# Patient Record
Sex: MALE | Race: WHITE | NOT HISPANIC OR LATINO | Employment: UNEMPLOYED | URBAN - METROPOLITAN AREA
[De-identification: names, ages, dates, MRNs, and addresses within clinical notes are randomized per-mention and may not be internally consistent; named-entity substitution may affect disease eponyms.]

---

## 2024-06-23 ENCOUNTER — APPOINTMENT (OUTPATIENT)
Dept: GENERAL RADIOLOGY | Facility: HOSPITAL | Age: 64
DRG: 190 | End: 2024-06-23
Payer: MEDICAID

## 2024-06-23 ENCOUNTER — HOSPITAL ENCOUNTER (INPATIENT)
Facility: HOSPITAL | Age: 64
LOS: 1 days | Discharge: HOSPICE | DRG: 190 | End: 2024-06-24
Attending: EMERGENCY MEDICINE | Admitting: INTERNAL MEDICINE
Payer: MEDICAID

## 2024-06-23 ENCOUNTER — APPOINTMENT (OUTPATIENT)
Dept: CARDIOLOGY | Facility: HOSPITAL | Age: 64
DRG: 190 | End: 2024-06-23
Payer: MEDICAID

## 2024-06-23 DIAGNOSIS — I50.9 ACUTE ON CHRONIC CONGESTIVE HEART FAILURE, UNSPECIFIED HEART FAILURE TYPE: ICD-10-CM

## 2024-06-23 DIAGNOSIS — J44.1 COPD WITH ACUTE EXACERBATION: ICD-10-CM

## 2024-06-23 DIAGNOSIS — F17.200 TOBACCO USE DISORDER: ICD-10-CM

## 2024-06-23 DIAGNOSIS — R05.1 ACUTE COUGH: ICD-10-CM

## 2024-06-23 DIAGNOSIS — R06.02 SHORTNESS OF BREATH: ICD-10-CM

## 2024-06-23 DIAGNOSIS — I48.91 ATRIAL FIBRILLATION WITH RAPID VENTRICULAR RESPONSE: Primary | ICD-10-CM

## 2024-06-23 DIAGNOSIS — I25.5 ISCHEMIC CARDIOMYOPATHY: ICD-10-CM

## 2024-06-23 DIAGNOSIS — I95.9 HYPOTENSION, UNSPECIFIED HYPOTENSION TYPE: ICD-10-CM

## 2024-06-23 DIAGNOSIS — D72.829 LEUKOCYTOSIS, UNSPECIFIED TYPE: ICD-10-CM

## 2024-06-23 DIAGNOSIS — R60.0 PERIPHERAL EDEMA: ICD-10-CM

## 2024-06-23 PROBLEM — I25.10 CAD (CORONARY ARTERY DISEASE): Status: ACTIVE | Noted: 2024-06-23

## 2024-06-23 PROBLEM — I50.22 CHRONIC HFREF (HEART FAILURE WITH REDUCED EJECTION FRACTION): Status: ACTIVE | Noted: 2024-06-23

## 2024-06-23 PROBLEM — I48.0 PAF (PAROXYSMAL ATRIAL FIBRILLATION): Status: ACTIVE | Noted: 2024-06-23

## 2024-06-23 PROBLEM — Z72.0 TOBACCO USE: Status: ACTIVE | Noted: 2024-06-23

## 2024-06-23 PROBLEM — J86.9 EMPYEMA: Status: ACTIVE | Noted: 2024-06-23

## 2024-06-23 LAB
ALBUMIN SERPL-MCNC: 3.8 G/DL (ref 3.5–5.2)
ALBUMIN SERPL-MCNC: 3.9 G/DL (ref 3.5–5.2)
ALBUMIN/GLOB SERPL: 1.3 G/DL
ALBUMIN/GLOB SERPL: 1.7 G/DL
ALP SERPL-CCNC: 104 U/L (ref 39–117)
ALP SERPL-CCNC: 117 U/L (ref 39–117)
ALT SERPL W P-5'-P-CCNC: 24 U/L (ref 1–41)
ALT SERPL W P-5'-P-CCNC: 34 U/L (ref 1–41)
AMPHET+METHAMPHET UR QL: NEGATIVE
AMPHETAMINES UR QL: NEGATIVE
ANION GAP SERPL CALCULATED.3IONS-SCNC: 11 MMOL/L (ref 5–15)
ANION GAP SERPL CALCULATED.3IONS-SCNC: 22 MMOL/L (ref 5–15)
APTT PPP: 29.8 SECONDS (ref 60–90)
ARTERIAL PATENCY WRIST A: ABNORMAL
AST SERPL-CCNC: 25 U/L (ref 1–40)
AST SERPL-CCNC: 37 U/L (ref 1–40)
ATMOSPHERIC PRESS: ABNORMAL MM[HG]
B PARAPERT DNA SPEC QL NAA+PROBE: NOT DETECTED
B PERT DNA SPEC QL NAA+PROBE: NOT DETECTED
BARBITURATES UR QL SCN: NEGATIVE
BASE EXCESS BLDA CALC-SCNC: -4.5 MMOL/L (ref 0–2)
BASOPHILS # BLD AUTO: 0.03 10*3/MM3 (ref 0–0.2)
BASOPHILS # BLD AUTO: 0.03 10*3/MM3 (ref 0–0.2)
BASOPHILS NFR BLD AUTO: 0.2 % (ref 0–1.5)
BASOPHILS NFR BLD AUTO: 0.2 % (ref 0–1.5)
BDY SITE: ABNORMAL
BENZODIAZ UR QL SCN: NEGATIVE
BH CV ECHO MEAS - AO MAX PG: 5.3 MMHG
BH CV ECHO MEAS - AO MEAN PG: 3.3 MMHG
BH CV ECHO MEAS - AO ROOT DIAM: 3.3 CM
BH CV ECHO MEAS - AO V2 MAX: 115 CM/SEC
BH CV ECHO MEAS - AO V2 VTI: 14.7 CM
BH CV ECHO MEAS - AVA(I,D): 1.63 CM2
BH CV ECHO MEAS - EDV(CUBED): 314.4 ML
BH CV ECHO MEAS - EDV(MOD-SP2): 234 ML
BH CV ECHO MEAS - EDV(MOD-SP4): 240 ML
BH CV ECHO MEAS - EF(MOD-BP): 13.7 %
BH CV ECHO MEAS - EF(MOD-SP2): 17.5 %
BH CV ECHO MEAS - EF(MOD-SP4): 13.8 %
BH CV ECHO MEAS - ESV(CUBED): 205.4 ML
BH CV ECHO MEAS - ESV(MOD-SP2): 193 ML
BH CV ECHO MEAS - ESV(MOD-SP4): 207 ML
BH CV ECHO MEAS - FS: 13.2 %
BH CV ECHO MEAS - IVS/LVPW: 0.82 CM
BH CV ECHO MEAS - IVSD: 0.9 CM
BH CV ECHO MEAS - LA DIMENSION: 4.9 CM
BH CV ECHO MEAS - LAT PEAK E' VEL: 13.2 CM/SEC
BH CV ECHO MEAS - LV MASS(C)D: 306 GRAMS
BH CV ECHO MEAS - LV MAX PG: 1.8 MMHG
BH CV ECHO MEAS - LV MEAN PG: 1 MMHG
BH CV ECHO MEAS - LV V1 MAX: 67.1 CM/SEC
BH CV ECHO MEAS - LV V1 VTI: 7.6 CM
BH CV ECHO MEAS - LVIDD: 6.8 CM
BH CV ECHO MEAS - LVIDS: 5.9 CM
BH CV ECHO MEAS - LVOT AREA: 3.1 CM2
BH CV ECHO MEAS - LVOT DIAM: 2 CM
BH CV ECHO MEAS - LVPWD: 1.1 CM
BH CV ECHO MEAS - MED PEAK E' VEL: 5.7 CM/SEC
BH CV ECHO MEAS - MR MAX PG: 67.6 MMHG
BH CV ECHO MEAS - MR MAX VEL: 411 CM/SEC
BH CV ECHO MEAS - MR MEAN PG: 42.5 MMHG
BH CV ECHO MEAS - MR MEAN VEL: 307 CM/SEC
BH CV ECHO MEAS - MR VTI: 114.5 CM
BH CV ECHO MEAS - MV DEC SLOPE: 660.5 CM/SEC2
BH CV ECHO MEAS - MV DEC TIME: 0.14 SEC
BH CV ECHO MEAS - MV E MAX VEL: 94.9 CM/SEC
BH CV ECHO MEAS - MV MAX PG: 5.9 MMHG
BH CV ECHO MEAS - MV MEAN PG: 3.3 MMHG
BH CV ECHO MEAS - MV P1/2T: 52.8 MSEC
BH CV ECHO MEAS - MV V2 VTI: 18.7 CM
BH CV ECHO MEAS - MVA(P1/2T): 4.2 CM2
BH CV ECHO MEAS - MVA(VTI): 1.28 CM2
BH CV ECHO MEAS - PA ACC TIME: 0.09 SEC
BH CV ECHO MEAS - RAP SYSTOLE: 8 MMHG
BH CV ECHO MEAS - RF(MV,LVOT)(1DIAM): 0.9 CM
BH CV ECHO MEAS - SV(LVOT): 24 ML
BH CV ECHO MEAS - SV(MOD-SP2): 41 ML
BH CV ECHO MEAS - SV(MOD-SP4): 33 ML
BH CV ECHO MEAS - TAPSE (>1.6): 1.58 CM
BH CV ECHO MEAS - TR MAX PG: 25.9 MMHG
BH CV ECHO MEAS - TR MAX VEL: 254 CM/SEC
BH CV ECHO MEASUREMENTS AVERAGE E/E' RATIO: 10.04
BH CV XLRA - RV BASE: 4.8 CM
BH CV XLRA - RV LENGTH: 9 CM
BH CV XLRA - RV MID: 2.8 CM
BH CV XLRA - TDI S': 12.3 CM/SEC
BILIRUB SERPL-MCNC: 1.9 MG/DL (ref 0–1.2)
BILIRUB SERPL-MCNC: 2.6 MG/DL (ref 0–1.2)
BODY TEMPERATURE: 37
BUN SERPL-MCNC: 45 MG/DL (ref 8–23)
BUN SERPL-MCNC: 45 MG/DL (ref 8–23)
BUN/CREAT SERPL: 36 (ref 7–25)
BUN/CREAT SERPL: 37.5 (ref 7–25)
BUPRENORPHINE SERPL-MCNC: NEGATIVE NG/ML
C PNEUM DNA NPH QL NAA+NON-PROBE: NOT DETECTED
CALCIUM SPEC-SCNC: 8.5 MG/DL (ref 8.6–10.5)
CALCIUM SPEC-SCNC: 8.5 MG/DL (ref 8.6–10.5)
CANNABINOIDS SERPL QL: NEGATIVE
CHLORIDE SERPL-SCNC: 92 MMOL/L (ref 98–107)
CHLORIDE SERPL-SCNC: 98 MMOL/L (ref 98–107)
CO2 BLDA-SCNC: 18.9 MMOL/L (ref 22–33)
CO2 SERPL-SCNC: 20 MMOL/L (ref 22–29)
CO2 SERPL-SCNC: 24 MMOL/L (ref 22–29)
COCAINE UR QL: NEGATIVE
COHGB MFR BLD: 3.8 % (ref 0–2)
CREAT SERPL-MCNC: 1.2 MG/DL (ref 0.76–1.27)
CREAT SERPL-MCNC: 1.25 MG/DL (ref 0.76–1.27)
D DIMER PPP FEU-MCNC: 1.72 MCGFEU/ML (ref 0–0.64)
D-LACTATE SERPL-SCNC: 2.2 MMOL/L (ref 0.5–2)
D-LACTATE SERPL-SCNC: 4.2 MMOL/L (ref 0.5–2)
D-LACTATE SERPL-SCNC: 4.3 MMOL/L (ref 0.5–2)
D-LACTATE SERPL-SCNC: 4.6 MMOL/L (ref 0.5–2)
DEPRECATED RDW RBC AUTO: 50.7 FL (ref 37–54)
DEPRECATED RDW RBC AUTO: 52 FL (ref 37–54)
EGFRCR SERPLBLD CKD-EPI 2021: 64.3 ML/MIN/1.73
EGFRCR SERPLBLD CKD-EPI 2021: 67.5 ML/MIN/1.73
EOSINOPHIL # BLD AUTO: 0 10*3/MM3 (ref 0–0.4)
EOSINOPHIL # BLD AUTO: 0.02 10*3/MM3 (ref 0–0.4)
EOSINOPHIL NFR BLD AUTO: 0 % (ref 0.3–6.2)
EOSINOPHIL NFR BLD AUTO: 0.1 % (ref 0.3–6.2)
EPAP: 0
ERYTHROCYTE [DISTWIDTH] IN BLOOD BY AUTOMATED COUNT: 14.5 % (ref 12.3–15.4)
ERYTHROCYTE [DISTWIDTH] IN BLOOD BY AUTOMATED COUNT: 14.6 % (ref 12.3–15.4)
ETHANOL BLD-MCNC: <10 MG/DL (ref 0–10)
FENTANYL UR-MCNC: NEGATIVE NG/ML
FLUAV SUBTYP SPEC NAA+PROBE: NOT DETECTED
FLUBV RNA ISLT QL NAA+PROBE: NOT DETECTED
GEN 5 2HR TROPONIN T REFLEX: 35 NG/L
GLOBULIN UR ELPH-MCNC: 2.3 GM/DL
GLOBULIN UR ELPH-MCNC: 2.9 GM/DL
GLUCOSE SERPL-MCNC: 106 MG/DL (ref 65–99)
GLUCOSE SERPL-MCNC: 126 MG/DL (ref 65–99)
HADV DNA SPEC NAA+PROBE: NOT DETECTED
HCO3 BLDA-SCNC: 18.1 MMOL/L (ref 20–26)
HCOV 229E RNA SPEC QL NAA+PROBE: NOT DETECTED
HCOV HKU1 RNA SPEC QL NAA+PROBE: NOT DETECTED
HCOV NL63 RNA SPEC QL NAA+PROBE: NOT DETECTED
HCOV OC43 RNA SPEC QL NAA+PROBE: NOT DETECTED
HCT VFR BLD AUTO: 50.6 % (ref 37.5–51)
HCT VFR BLD AUTO: 56 % (ref 37.5–51)
HCT VFR BLD CALC: 50.1 % (ref 38–51)
HGB BLD-MCNC: 16.9 G/DL (ref 13–17.7)
HGB BLD-MCNC: 18.5 G/DL (ref 13–17.7)
HGB BLDA-MCNC: 16.3 G/DL (ref 13.5–17.5)
HMPV RNA NPH QL NAA+NON-PROBE: NOT DETECTED
HOLD SPECIMEN: NORMAL
HPIV1 RNA ISLT QL NAA+PROBE: NOT DETECTED
HPIV2 RNA SPEC QL NAA+PROBE: NOT DETECTED
HPIV3 RNA NPH QL NAA+PROBE: NOT DETECTED
HPIV4 P GENE NPH QL NAA+PROBE: NOT DETECTED
IMM GRANULOCYTES # BLD AUTO: 0.08 10*3/MM3 (ref 0–0.05)
IMM GRANULOCYTES # BLD AUTO: 0.08 10*3/MM3 (ref 0–0.05)
IMM GRANULOCYTES NFR BLD AUTO: 0.5 % (ref 0–0.5)
IMM GRANULOCYTES NFR BLD AUTO: 0.6 % (ref 0–0.5)
INHALED O2 CONCENTRATION: 36 %
INR PPP: 1.42 (ref 0.89–1.12)
IPAP: 0
LEFT ATRIUM VOLUME INDEX: 55.7 ML/M2
LIPASE SERPL-CCNC: 19 U/L (ref 13–60)
LYMPHOCYTES # BLD AUTO: 0.97 10*3/MM3 (ref 0.7–3.1)
LYMPHOCYTES # BLD AUTO: 1.94 10*3/MM3 (ref 0.7–3.1)
LYMPHOCYTES NFR BLD AUTO: 11.3 % (ref 19.6–45.3)
LYMPHOCYTES NFR BLD AUTO: 7.4 % (ref 19.6–45.3)
M PNEUMO IGG SER IA-ACNC: NOT DETECTED
MAGNESIUM SERPL-MCNC: 2 MG/DL (ref 1.6–2.4)
MAGNESIUM SERPL-MCNC: 2.2 MG/DL (ref 1.6–2.4)
MCH RBC QN AUTO: 31.9 PG (ref 26.6–33)
MCH RBC QN AUTO: 32.1 PG (ref 26.6–33)
MCHC RBC AUTO-ENTMCNC: 33 G/DL (ref 31.5–35.7)
MCHC RBC AUTO-ENTMCNC: 33.4 G/DL (ref 31.5–35.7)
MCV RBC AUTO: 95.5 FL (ref 79–97)
MCV RBC AUTO: 97.1 FL (ref 79–97)
METHADONE UR QL SCN: NEGATIVE
METHGB BLD QL: 0.2 % (ref 0–1.5)
MODALITY: ABNORMAL
MONOCYTES # BLD AUTO: 0.63 10*3/MM3 (ref 0.1–0.9)
MONOCYTES # BLD AUTO: 1.61 10*3/MM3 (ref 0.1–0.9)
MONOCYTES NFR BLD AUTO: 4.8 % (ref 5–12)
MONOCYTES NFR BLD AUTO: 9.4 % (ref 5–12)
NEUTROPHILS NFR BLD AUTO: 11.47 10*3/MM3 (ref 1.7–7)
NEUTROPHILS NFR BLD AUTO: 13.5 10*3/MM3 (ref 1.7–7)
NEUTROPHILS NFR BLD AUTO: 78.5 % (ref 42.7–76)
NEUTROPHILS NFR BLD AUTO: 87 % (ref 42.7–76)
NRBC BLD AUTO-RTO: 0 /100 WBC (ref 0–0.2)
NRBC BLD AUTO-RTO: 0 /100 WBC (ref 0–0.2)
NT-PROBNP SERPL-MCNC: ABNORMAL PG/ML (ref 0–900)
OPIATES UR QL: POSITIVE
OXYCODONE UR QL SCN: NEGATIVE
OXYHGB MFR BLDV: 94.3 % (ref 94–99)
PAW @ PEAK INSP FLOW SETTING VENT: 0 CMH2O
PCO2 BLDA: 27.2 MM HG (ref 35–45)
PCO2 TEMP ADJ BLD: 27.2 MM HG (ref 35–48)
PCP UR QL SCN: NEGATIVE
PH BLDA: 7.43 PH UNITS (ref 7.35–7.45)
PH, TEMP CORRECTED: 7.43 PH UNITS
PHOSPHATE SERPL-MCNC: 4.8 MG/DL (ref 2.5–4.5)
PLATELET # BLD AUTO: 204 10*3/MM3 (ref 140–450)
PLATELET # BLD AUTO: 240 10*3/MM3 (ref 140–450)
PMV BLD AUTO: 11.3 FL (ref 6–12)
PMV BLD AUTO: 11.5 FL (ref 6–12)
PO2 BLDA: 92.6 MM HG (ref 83–108)
PO2 TEMP ADJ BLD: 92.6 MM HG (ref 83–108)
POTASSIUM SERPL-SCNC: 4 MMOL/L (ref 3.5–5.2)
POTASSIUM SERPL-SCNC: 4.7 MMOL/L (ref 3.5–5.2)
PROT SERPL-MCNC: 6.1 G/DL (ref 6–8.5)
PROT SERPL-MCNC: 6.8 G/DL (ref 6–8.5)
PROTHROMBIN TIME: 17.5 SECONDS (ref 12.2–14.5)
RBC # BLD AUTO: 5.3 10*6/MM3 (ref 4.14–5.8)
RBC # BLD AUTO: 5.77 10*6/MM3 (ref 4.14–5.8)
RHINOVIRUS RNA SPEC NAA+PROBE: NOT DETECTED
RSV RNA NPH QL NAA+NON-PROBE: NOT DETECTED
SARS-COV-2 RNA NPH QL NAA+NON-PROBE: NOT DETECTED
SODIUM SERPL-SCNC: 133 MMOL/L (ref 136–145)
SODIUM SERPL-SCNC: 134 MMOL/L (ref 136–145)
TOTAL RATE: 0 BREATHS/MINUTE
TRICYCLICS UR QL SCN: NEGATIVE
TROPONIN T DELTA: 1 NG/L
TROPONIN T SERPL HS-MCNC: 34 NG/L
TROPONIN T SERPL HS-MCNC: 40 NG/L
TSH SERPL DL<=0.05 MIU/L-ACNC: 3.17 UIU/ML (ref 0.27–4.2)
UFH PPP CHRO-ACNC: 0.1 IU/ML (ref 0.3–0.7)
WBC NRBC COR # BLD AUTO: 13.18 10*3/MM3 (ref 3.4–10.8)
WBC NRBC COR # BLD AUTO: 17.18 10*3/MM3 (ref 3.4–10.8)
WHOLE BLOOD HOLD COAG: NORMAL
WHOLE BLOOD HOLD SPECIMEN: NORMAL

## 2024-06-23 PROCEDURE — 25010000002 METHYLPREDNISOLONE PER 125 MG: Performed by: EMERGENCY MEDICINE

## 2024-06-23 PROCEDURE — 94799 UNLISTED PULMONARY SVC/PX: CPT

## 2024-06-23 PROCEDURE — 83880 ASSAY OF NATRIURETIC PEPTIDE: CPT | Performed by: EMERGENCY MEDICINE

## 2024-06-23 PROCEDURE — 0202U NFCT DS 22 TRGT SARS-COV-2: CPT | Performed by: EMERGENCY MEDICINE

## 2024-06-23 PROCEDURE — 83605 ASSAY OF LACTIC ACID: CPT | Performed by: EMERGENCY MEDICINE

## 2024-06-23 PROCEDURE — 85025 COMPLETE CBC W/AUTO DIFF WBC: CPT | Performed by: INTERNAL MEDICINE

## 2024-06-23 PROCEDURE — 25010000002 MORPHINE PER 10 MG: Performed by: INTERNAL MEDICINE

## 2024-06-23 PROCEDURE — 36415 COLL VENOUS BLD VENIPUNCTURE: CPT

## 2024-06-23 PROCEDURE — 93306 TTE W/DOPPLER COMPLETE: CPT | Performed by: INTERNAL MEDICINE

## 2024-06-23 PROCEDURE — 80053 COMPREHEN METABOLIC PANEL: CPT | Performed by: INTERNAL MEDICINE

## 2024-06-23 PROCEDURE — 25010000002 ENOXAPARIN PER 10 MG: Performed by: NURSE PRACTITIONER

## 2024-06-23 PROCEDURE — 85610 PROTHROMBIN TIME: CPT | Performed by: EMERGENCY MEDICINE

## 2024-06-23 PROCEDURE — 25010000002 DOBUTAMINE PER 250 MG: Performed by: EMERGENCY MEDICINE

## 2024-06-23 PROCEDURE — 25010000002 ONDANSETRON PER 1 MG: Performed by: INTERNAL MEDICINE

## 2024-06-23 PROCEDURE — 25010000002 DIGOXIN PER 500 MCG: Performed by: NURSE PRACTITIONER

## 2024-06-23 PROCEDURE — 84100 ASSAY OF PHOSPHORUS: CPT | Performed by: INTERNAL MEDICINE

## 2024-06-23 PROCEDURE — 25010000002 HEPARIN (PORCINE) 25000-0.45 UT/250ML-% SOLUTION: Performed by: EMERGENCY MEDICINE

## 2024-06-23 PROCEDURE — 25810000003 SODIUM CHLORIDE 0.9 % SOLUTION 250 ML FLEX CONT: Performed by: EMERGENCY MEDICINE

## 2024-06-23 PROCEDURE — 80307 DRUG TEST PRSMV CHEM ANLYZR: CPT | Performed by: EMERGENCY MEDICINE

## 2024-06-23 PROCEDURE — 93005 ELECTROCARDIOGRAM TRACING: CPT | Performed by: EMERGENCY MEDICINE

## 2024-06-23 PROCEDURE — 36600 WITHDRAWAL OF ARTERIAL BLOOD: CPT

## 2024-06-23 PROCEDURE — 25010000002 CEFTRIAXONE PER 250 MG: Performed by: EMERGENCY MEDICINE

## 2024-06-23 PROCEDURE — 94640 AIRWAY INHALATION TREATMENT: CPT

## 2024-06-23 PROCEDURE — 82805 BLOOD GASES W/O2 SATURATION: CPT

## 2024-06-23 PROCEDURE — 93010 ELECTROCARDIOGRAM REPORT: CPT | Performed by: INTERNAL MEDICINE

## 2024-06-23 PROCEDURE — 25010000002 PHENYLEPHRINE 10 MG/ML SOLUTION 5 ML VIAL: Performed by: EMERGENCY MEDICINE

## 2024-06-23 PROCEDURE — 25010000002 METHYLPREDNISOLONE PER 40 MG: Performed by: NURSE PRACTITIONER

## 2024-06-23 PROCEDURE — 99291 CRITICAL CARE FIRST HOUR: CPT

## 2024-06-23 PROCEDURE — 83690 ASSAY OF LIPASE: CPT | Performed by: EMERGENCY MEDICINE

## 2024-06-23 PROCEDURE — 83605 ASSAY OF LACTIC ACID: CPT | Performed by: INTERNAL MEDICINE

## 2024-06-23 PROCEDURE — 82375 ASSAY CARBOXYHB QUANT: CPT

## 2024-06-23 PROCEDURE — 85379 FIBRIN DEGRADATION QUANT: CPT | Performed by: EMERGENCY MEDICINE

## 2024-06-23 PROCEDURE — 80053 COMPREHEN METABOLIC PANEL: CPT | Performed by: EMERGENCY MEDICINE

## 2024-06-23 PROCEDURE — 85025 COMPLETE CBC W/AUTO DIFF WBC: CPT | Performed by: EMERGENCY MEDICINE

## 2024-06-23 PROCEDURE — 93306 TTE W/DOPPLER COMPLETE: CPT

## 2024-06-23 PROCEDURE — 85730 THROMBOPLASTIN TIME PARTIAL: CPT | Performed by: EMERGENCY MEDICINE

## 2024-06-23 PROCEDURE — 99291 CRITICAL CARE FIRST HOUR: CPT | Performed by: INTERNAL MEDICINE

## 2024-06-23 PROCEDURE — 84443 ASSAY THYROID STIM HORMONE: CPT | Performed by: EMERGENCY MEDICINE

## 2024-06-23 PROCEDURE — 25010000002 BUMETANIDE PER 0.5 MG: Performed by: NURSE PRACTITIONER

## 2024-06-23 PROCEDURE — 83735 ASSAY OF MAGNESIUM: CPT | Performed by: INTERNAL MEDICINE

## 2024-06-23 PROCEDURE — 83735 ASSAY OF MAGNESIUM: CPT | Performed by: EMERGENCY MEDICINE

## 2024-06-23 PROCEDURE — 25010000002 MORPHINE PER 10 MG: Performed by: NURSE PRACTITIONER

## 2024-06-23 PROCEDURE — 83050 HGB METHEMOGLOBIN QUAN: CPT

## 2024-06-23 PROCEDURE — 93005 ELECTROCARDIOGRAM TRACING: CPT

## 2024-06-23 PROCEDURE — 25810000003 SODIUM CHLORIDE 0.9 % SOLUTION: Performed by: EMERGENCY MEDICINE

## 2024-06-23 PROCEDURE — 82077 ASSAY SPEC XCP UR&BREATH IA: CPT | Performed by: EMERGENCY MEDICINE

## 2024-06-23 PROCEDURE — 71045 X-RAY EXAM CHEST 1 VIEW: CPT

## 2024-06-23 PROCEDURE — 84484 ASSAY OF TROPONIN QUANT: CPT | Performed by: INTERNAL MEDICINE

## 2024-06-23 PROCEDURE — 85520 HEPARIN ASSAY: CPT | Performed by: EMERGENCY MEDICINE

## 2024-06-23 PROCEDURE — 87040 BLOOD CULTURE FOR BACTERIA: CPT | Performed by: EMERGENCY MEDICINE

## 2024-06-23 PROCEDURE — 25010000002 MAGNESIUM SULFATE 2 GM/50ML SOLUTION: Performed by: NURSE PRACTITIONER

## 2024-06-23 PROCEDURE — 84484 ASSAY OF TROPONIN QUANT: CPT | Performed by: EMERGENCY MEDICINE

## 2024-06-23 RX ORDER — ASPIRIN 81 MG/1
324 TABLET, CHEWABLE ORAL ONCE
Status: DISCONTINUED | OUTPATIENT
Start: 2024-06-23 | End: 2024-06-23

## 2024-06-23 RX ORDER — IPRATROPIUM BROMIDE AND ALBUTEROL SULFATE 2.5; .5 MG/3ML; MG/3ML
3 SOLUTION RESPIRATORY (INHALATION) EVERY 4 HOURS PRN
Status: DISCONTINUED | OUTPATIENT
Start: 2024-06-23 | End: 2024-06-24 | Stop reason: HOSPADM

## 2024-06-23 RX ORDER — ENOXAPARIN SODIUM 100 MG/ML
40 INJECTION SUBCUTANEOUS DAILY
Status: DISCONTINUED | OUTPATIENT
Start: 2024-06-23 | End: 2024-06-24 | Stop reason: HOSPADM

## 2024-06-23 RX ORDER — DIGOXIN 125 MCG
125 TABLET ORAL
Status: DISCONTINUED | OUTPATIENT
Start: 2024-06-24 | End: 2024-06-24 | Stop reason: HOSPADM

## 2024-06-23 RX ORDER — HEPARIN SODIUM 1000 [USP'U]/ML
4000 INJECTION, SOLUTION INTRAVENOUS; SUBCUTANEOUS ONCE
Status: DISCONTINUED | OUTPATIENT
Start: 2024-06-23 | End: 2024-06-23

## 2024-06-23 RX ORDER — AMOXICILLIN 250 MG
2 CAPSULE ORAL 2 TIMES DAILY
Status: DISCONTINUED | OUTPATIENT
Start: 2024-06-23 | End: 2024-06-24 | Stop reason: HOSPADM

## 2024-06-23 RX ORDER — GABAPENTIN 600 MG/1
600 TABLET ORAL 2 TIMES DAILY
COMMUNITY

## 2024-06-23 RX ORDER — ONDANSETRON 2 MG/ML
4 INJECTION INTRAMUSCULAR; INTRAVENOUS EVERY 6 HOURS PRN
Status: DISCONTINUED | OUTPATIENT
Start: 2024-06-23 | End: 2024-06-24 | Stop reason: HOSPADM

## 2024-06-23 RX ORDER — IPRATROPIUM BROMIDE AND ALBUTEROL SULFATE 2.5; .5 MG/3ML; MG/3ML
3 SOLUTION RESPIRATORY (INHALATION) ONCE
Status: COMPLETED | OUTPATIENT
Start: 2024-06-23 | End: 2024-06-23

## 2024-06-23 RX ORDER — MAGNESIUM SULFATE HEPTAHYDRATE 40 MG/ML
2 INJECTION, SOLUTION INTRAVENOUS ONCE
Status: COMPLETED | OUTPATIENT
Start: 2024-06-23 | End: 2024-06-23

## 2024-06-23 RX ORDER — GABAPENTIN 100 MG/1
100 CAPSULE ORAL NIGHTLY
Status: DISCONTINUED | OUTPATIENT
Start: 2024-06-23 | End: 2024-06-24

## 2024-06-23 RX ORDER — POLYETHYLENE GLYCOL 3350 17 G/17G
17 POWDER, FOR SOLUTION ORAL DAILY PRN
Status: DISCONTINUED | OUTPATIENT
Start: 2024-06-23 | End: 2024-06-24 | Stop reason: HOSPADM

## 2024-06-23 RX ORDER — SODIUM CHLORIDE 0.9 % (FLUSH) 0.9 %
10 SYRINGE (ML) INJECTION AS NEEDED
Status: DISCONTINUED | OUTPATIENT
Start: 2024-06-23 | End: 2024-06-23

## 2024-06-23 RX ORDER — DOBUTAMINE HYDROCHLORIDE 100 MG/100ML
2-20 INJECTION INTRAVENOUS
Status: DISCONTINUED | OUTPATIENT
Start: 2024-06-23 | End: 2024-06-23

## 2024-06-23 RX ORDER — MORPHINE SULFATE 2 MG/ML
1 INJECTION, SOLUTION INTRAMUSCULAR; INTRAVENOUS EVERY 4 HOURS PRN
Status: DISCONTINUED | OUTPATIENT
Start: 2024-06-23 | End: 2024-06-23

## 2024-06-23 RX ORDER — METHYLPREDNISOLONE SODIUM SUCCINATE 40 MG/ML
40 INJECTION, POWDER, LYOPHILIZED, FOR SOLUTION INTRAMUSCULAR; INTRAVENOUS EVERY 12 HOURS
Status: DISCONTINUED | OUTPATIENT
Start: 2024-06-23 | End: 2024-06-24

## 2024-06-23 RX ORDER — DIGOXIN 0.25 MG/ML
250 INJECTION INTRAMUSCULAR; INTRAVENOUS ONCE
Status: COMPLETED | OUTPATIENT
Start: 2024-06-23 | End: 2024-06-23

## 2024-06-23 RX ORDER — UREA 10 %
5 LOTION (ML) TOPICAL NIGHTLY PRN
Status: DISCONTINUED | OUTPATIENT
Start: 2024-06-23 | End: 2024-06-24 | Stop reason: HOSPADM

## 2024-06-23 RX ORDER — HEPARIN SODIUM 1000 [USP'U]/ML
25 INJECTION, SOLUTION INTRAVENOUS; SUBCUTANEOUS AS NEEDED
Status: DISCONTINUED | OUTPATIENT
Start: 2024-06-23 | End: 2024-06-23

## 2024-06-23 RX ORDER — HEPARIN SODIUM 1000 [USP'U]/ML
50 INJECTION, SOLUTION INTRAVENOUS; SUBCUTANEOUS AS NEEDED
Status: DISCONTINUED | OUTPATIENT
Start: 2024-06-23 | End: 2024-06-23

## 2024-06-23 RX ORDER — SODIUM CHLORIDE 9 MG/ML
40 INJECTION, SOLUTION INTRAVENOUS AS NEEDED
Status: DISCONTINUED | OUTPATIENT
Start: 2024-06-23 | End: 2024-06-24 | Stop reason: HOSPADM

## 2024-06-23 RX ORDER — BUMETANIDE 0.25 MG/ML
2 INJECTION INTRAMUSCULAR; INTRAVENOUS ONCE
Status: COMPLETED | OUTPATIENT
Start: 2024-06-23 | End: 2024-06-23

## 2024-06-23 RX ORDER — BISACODYL 10 MG
10 SUPPOSITORY, RECTAL RECTAL DAILY PRN
Status: DISCONTINUED | OUTPATIENT
Start: 2024-06-23 | End: 2024-06-24 | Stop reason: HOSPADM

## 2024-06-23 RX ORDER — PROMETHAZINE HYDROCHLORIDE 12.5 MG/1
12.5 TABLET ORAL EVERY 6 HOURS PRN
Status: DISCONTINUED | OUTPATIENT
Start: 2024-06-23 | End: 2024-06-24 | Stop reason: HOSPADM

## 2024-06-23 RX ORDER — SODIUM CHLORIDE 0.9 % (FLUSH) 0.9 %
10 SYRINGE (ML) INJECTION EVERY 12 HOURS SCHEDULED
Status: DISCONTINUED | OUTPATIENT
Start: 2024-06-23 | End: 2024-06-24 | Stop reason: HOSPADM

## 2024-06-23 RX ORDER — METOLAZONE 5 MG/1
20 TABLET ORAL ONCE
Status: COMPLETED | OUTPATIENT
Start: 2024-06-23 | End: 2024-06-23

## 2024-06-23 RX ORDER — HEPARIN SODIUM 10000 [USP'U]/100ML
12 INJECTION, SOLUTION INTRAVENOUS
Status: DISCONTINUED | OUTPATIENT
Start: 2024-06-23 | End: 2024-06-23

## 2024-06-23 RX ORDER — BUMETANIDE 0.25 MG/ML
2 INJECTION INTRAMUSCULAR; INTRAVENOUS ONCE
Status: COMPLETED | OUTPATIENT
Start: 2024-06-24 | End: 2024-06-24

## 2024-06-23 RX ORDER — BISACODYL 5 MG/1
5 TABLET, DELAYED RELEASE ORAL DAILY PRN
Status: DISCONTINUED | OUTPATIENT
Start: 2024-06-23 | End: 2024-06-24 | Stop reason: HOSPADM

## 2024-06-23 RX ORDER — PROMETHAZINE HYDROCHLORIDE 12.5 MG/1
12.5 SUPPOSITORY RECTAL EVERY 6 HOURS PRN
Status: DISCONTINUED | OUTPATIENT
Start: 2024-06-23 | End: 2024-06-24 | Stop reason: HOSPADM

## 2024-06-23 RX ORDER — NITROGLYCERIN 0.4 MG/1
0.4 TABLET SUBLINGUAL
Status: DISCONTINUED | OUTPATIENT
Start: 2024-06-23 | End: 2024-06-24 | Stop reason: HOSPADM

## 2024-06-23 RX ORDER — MORPHINE SULFATE 2 MG/ML
1 INJECTION, SOLUTION INTRAMUSCULAR; INTRAVENOUS ONCE
Status: COMPLETED | OUTPATIENT
Start: 2024-06-23 | End: 2024-06-23

## 2024-06-23 RX ORDER — MORPHINE SULFATE 2 MG/ML
2 INJECTION, SOLUTION INTRAMUSCULAR; INTRAVENOUS EVERY 4 HOURS PRN
Status: DISCONTINUED | OUTPATIENT
Start: 2024-06-23 | End: 2024-06-24 | Stop reason: HOSPADM

## 2024-06-23 RX ORDER — METHYLPREDNISOLONE SODIUM SUCCINATE 125 MG/2ML
125 INJECTION, POWDER, LYOPHILIZED, FOR SOLUTION INTRAMUSCULAR; INTRAVENOUS ONCE
Status: COMPLETED | OUTPATIENT
Start: 2024-06-23 | End: 2024-06-23

## 2024-06-23 RX ORDER — BUDESONIDE AND FORMOTEROL FUMARATE DIHYDRATE 160; 4.5 UG/1; UG/1
2 AEROSOL RESPIRATORY (INHALATION)
Status: DISCONTINUED | OUTPATIENT
Start: 2024-06-23 | End: 2024-06-24 | Stop reason: HOSPADM

## 2024-06-23 RX ORDER — METOLAZONE 5 MG/1
10 TABLET ORAL ONCE
Status: COMPLETED | OUTPATIENT
Start: 2024-06-24 | End: 2024-06-24

## 2024-06-23 RX ORDER — MORPHINE SULFATE 30 MG/1
30 TABLET, FILM COATED, EXTENDED RELEASE ORAL 2 TIMES DAILY
COMMUNITY

## 2024-06-23 RX ORDER — SODIUM CHLORIDE 0.9 % (FLUSH) 0.9 %
10 SYRINGE (ML) INJECTION AS NEEDED
Status: DISCONTINUED | OUTPATIENT
Start: 2024-06-23 | End: 2024-06-24 | Stop reason: HOSPADM

## 2024-06-23 RX ORDER — DIGOXIN 0.25 MG/ML
250 INJECTION INTRAMUSCULAR; INTRAVENOUS ONCE
Status: COMPLETED | OUTPATIENT
Start: 2024-06-24 | End: 2024-06-24

## 2024-06-23 RX ORDER — MORPHINE SULFATE 30 MG/1
30 TABLET, FILM COATED, EXTENDED RELEASE ORAL EVERY 12 HOURS SCHEDULED
Status: DISCONTINUED | OUTPATIENT
Start: 2024-06-23 | End: 2024-06-24 | Stop reason: HOSPADM

## 2024-06-23 RX ADMIN — DOBUTAMINE HYDROCHLORIDE 2 MCG/KG/MIN: 100 INJECTION INTRAVENOUS at 00:30

## 2024-06-23 RX ADMIN — DIGOXIN 250 MCG: 0.25 INJECTION INTRAMUSCULAR; INTRAVENOUS at 02:24

## 2024-06-23 RX ADMIN — SODIUM CHLORIDE 2000 MG: 900 INJECTION INTRAVENOUS at 01:32

## 2024-06-23 RX ADMIN — MORPHINE SULFATE 30 MG: 30 TABLET, FILM COATED, EXTENDED RELEASE ORAL at 21:23

## 2024-06-23 RX ADMIN — PHENYLEPHRINE HYDROCHLORIDE 1.7 MCG/KG/MIN: 10 INJECTION INTRAVENOUS at 09:39

## 2024-06-23 RX ADMIN — DOBUTAMINE HYDROCHLORIDE 2 MCG/KG/MIN: 100 INJECTION INTRAVENOUS at 00:37

## 2024-06-23 RX ADMIN — HEPARIN SODIUM 12 UNITS/KG/HR: 10000 INJECTION, SOLUTION INTRAVENOUS at 01:21

## 2024-06-23 RX ADMIN — PHENYLEPHRINE HYDROCHLORIDE 0.5 MCG/KG/MIN: 10 INJECTION INTRAVENOUS at 01:11

## 2024-06-23 RX ADMIN — PHENYLEPHRINE HYDROCHLORIDE 1.4 MCG/KG/MIN: 10 INJECTION INTRAVENOUS at 17:50

## 2024-06-23 RX ADMIN — Medication 10 ML: at 21:23

## 2024-06-23 RX ADMIN — SODIUM CHLORIDE 1000 ML: 900 INJECTION, SOLUTION INTRAVENOUS at 00:38

## 2024-06-23 RX ADMIN — SENNOSIDES AND DOCUSATE SODIUM 2 TABLET: 50; 8.6 TABLET ORAL at 21:26

## 2024-06-23 RX ADMIN — Medication 10 ML: at 09:54

## 2024-06-23 RX ADMIN — GABAPENTIN 100 MG: 100 CAPSULE ORAL at 21:23

## 2024-06-23 RX ADMIN — METHYLPREDNISOLONE SODIUM SUCCINATE 125 MG: 125 INJECTION, POWDER, FOR SOLUTION INTRAMUSCULAR; INTRAVENOUS at 00:54

## 2024-06-23 RX ADMIN — MORPHINE SULFATE 1 MG: 2 INJECTION, SOLUTION INTRAMUSCULAR; INTRAVENOUS at 09:37

## 2024-06-23 RX ADMIN — DOXYCYCLINE 100 MG: 100 INJECTION, POWDER, LYOPHILIZED, FOR SOLUTION INTRAVENOUS at 02:37

## 2024-06-23 RX ADMIN — BUDESONIDE AND FORMOTEROL FUMARATE DIHYDRATE 2 PUFF: 160; 4.5 AEROSOL RESPIRATORY (INHALATION) at 20:48

## 2024-06-23 RX ADMIN — METOLAZONE 20 MG: 5 TABLET ORAL at 02:36

## 2024-06-23 RX ADMIN — Medication 10 ML: at 01:39

## 2024-06-23 RX ADMIN — IPRATROPIUM BROMIDE AND ALBUTEROL SULFATE 3 ML: .5; 2.5 SOLUTION RESPIRATORY (INHALATION) at 00:45

## 2024-06-23 RX ADMIN — MAGNESIUM SULFATE HEPTAHYDRATE 2 G: 2 INJECTION, SOLUTION INTRAVENOUS at 04:09

## 2024-06-23 RX ADMIN — ONDANSETRON 4 MG: 2 INJECTION INTRAMUSCULAR; INTRAVENOUS at 09:48

## 2024-06-23 RX ADMIN — METHYLPREDNISOLONE SODIUM SUCCINATE 40 MG: 40 INJECTION, POWDER, FOR SOLUTION INTRAMUSCULAR; INTRAVENOUS at 21:23

## 2024-06-23 RX ADMIN — BUDESONIDE AND FORMOTEROL FUMARATE DIHYDRATE 2 PUFF: 160; 4.5 AEROSOL RESPIRATORY (INHALATION) at 10:23

## 2024-06-23 RX ADMIN — METHYLPREDNISOLONE SODIUM SUCCINATE 40 MG: 40 INJECTION, POWDER, FOR SOLUTION INTRAMUSCULAR; INTRAVENOUS at 09:58

## 2024-06-23 RX ADMIN — MORPHINE SULFATE 1 MG: 2 INJECTION, SOLUTION INTRAMUSCULAR; INTRAVENOUS at 10:59

## 2024-06-23 RX ADMIN — BUMETANIDE 2 MG: 0.25 INJECTION, SOLUTION INTRAMUSCULAR; INTRAVENOUS at 04:16

## 2024-06-23 RX ADMIN — ENOXAPARIN SODIUM 40 MG: 100 INJECTION SUBCUTANEOUS at 09:52

## 2024-06-23 NOTE — CONSULTS
Palliative Care Initial Consult   Attending Physician: Madalyn Louis MD  Referring Provider: Michael DSOUZA      Reason for Referral:  assistance with clarification of goals of care    Code Status:   Code Status and Medical Interventions:   Ordered at: 06/23/24 0129     Medical Intervention Limits:    No intubation (DNI)    No dialysis    No artificial nutrition    Other     Code Status (Patient has no pulse and is not breathing):    No CPR (Do Not Attempt to Resuscitate)     Medical Interventions (Patient has pulse or is breathing):    Limited Support     Additional Medical Interventions Limits:    no invasive procedues      Advanced Directives: Advance Directive Status: Patient has advance directive, copy requested   Family/Support: significant other     Goals of Care:    Goals of Care/Treatment Preferences:    Improve breathing       HPI:   65 yo male home hospice pt presented via 911 with SOA, chest/epigastric pain with some nausea and cough.  He has a history of ESHD and COPD.  Labs indicate elevated lactate and HS trooponin as well as BNP.  UDS is consistent with home meds.  Reports did receive some relief of pain nwith IV morphine and pain at 5/10 when seen.  Requiring pressor support.    ROS:   + nausea    Palliative Pain Assessment:   Are you having pain at the present time or have you recently taken pain medication to treat pain? Yes       Pain Assessment:  Severity:  severe  Location:  Chest  Frequency:   waxing and waning  Onset:  1 day(s) ago.  Duration:   1 day(s)  Character/Quality:  waxing and waning, aching, moderate, severe         Influencing Factors:  Relieving:  Alleviating Factors: pain medication  Aggravating:  increased activity    Functional Impact of Pain:  limits all activity            Palliative Dyspnea Assessment:   Are you short of breath at the present time or have you recently taken medication to treat shortness of breath?  Yes    Dyspnea Assessment:   Symptoms: Clem  Fields c/o shortness of breath and nonproductive cough    Signs: decreased O2 saturation, tachypnea, and use of accessory muscles with breathing    Aggravating Factors: exertion    Alleviating Factors: rest, medication, and oxygen    Pulmonary History: COPD                    Past Medical History:   Diagnosis Date    CAD (coronary artery disease) 06/23/2024    Empyema 06/23/2024    Ischemic cardiomyopathy (EF 15-20%) 06/23/2024    PAF (paroxysmal atrial fibrillation) w/ RVR 06/23/2024     History reviewed. No pertinent surgical history.  Social History     Socioeconomic History    Marital status: Single   Tobacco Use    Smoking status: Every Day     Current packs/day: 0.25     Types: Cigarettes    Smokeless tobacco: Never   Vaping Use    Vaping status: Every Day    Substances: Nicotine    Devices: Disposable    Passive vaping exposure: Yes   Substance and Sexual Activity    Alcohol use: Never    Drug use: Never    Sexual activity: Defer     History reviewed. No pertinent family history.    No Known Allergies      Current Facility-Administered Medications:     sennosides-docusate (PERICOLACE) 8.6-50 MG per tablet 2 tablet, 2 tablet, Oral, BID **AND** polyethylene glycol (MIRALAX) packet 17 g, 17 g, Oral, Daily PRN **AND** bisacodyl (DULCOLAX) EC tablet 5 mg, 5 mg, Oral, Daily PRN **AND** bisacodyl (DULCOLAX) suppository 10 mg, 10 mg, Rectal, Daily PRN, Michael Medellin APRN    budesonide-formoterol (SYMBICORT) 160-4.5 MCG/ACT inhaler 2 puff, 2 puff, Inhalation, BID - RT, Michael Medellin APRN, 2 puff at 06/23/24 1023    Calcium Replacement - Follow Nurse / BPA Driven Protocol, , Does not apply, PRN, Michael Medellin APRN    Enoxaparin Sodium (LOVENOX) syringe 40 mg, 40 mg, Subcutaneous, Daily, Michael Medellin APRN, 40 mg at 06/23/24 0952    gabapentin (NEURONTIN) capsule 100 mg, 100 mg, Oral, Nightly, Michael Medellin APRN    ipratropium-albuterol (DUO-NEB) nebulizer solution 3 mL, 3 mL, Nebulization,  "Q4H PRN, Michael Medellin APRN    Magnesium Cardiology Dose Replacement - Follow Nurse / BPA Driven Protocol, , Does not apply, PRNLacie Nicholas J, APRN    methylPREDNISolone sodium succinate (SOLU-Medrol) injection 40 mg, 40 mg, Intravenous, Q12H, Michael Medellin APRN, 40 mg at 06/23/24 0958    morphine injection 2 mg, 2 mg, Intravenous, Q4H PRN, Lidia Simon APRN    nitroglycerin (NITROSTAT) SL tablet 0.4 mg, 0.4 mg, Sublingual, Q5 Min PRN, Michael Medellin APRN    ondansetron (ZOFRAN) injection 4 mg, 4 mg, Intravenous, Q6H PRN, Itz Cordero MD, 4 mg at 06/23/24 0948    phenylephrine (RUBINA-SYNEPHRINE) 50 mg in sodium chloride 0.9 % 250 mL infusion, 0.5-3 mcg/kg/min, Intravenous, Titrated, Sangeetha Garcia MD, Last Rate: 32.4 mL/hr at 06/23/24 0939, 1.7 mcg/kg/min at 06/23/24 0939    Phosphorus Replacement - Follow Nurse / BPA Driven Protocol, , Does not apply, PRLacie FLOREZ Nicholas J, APRN    Potassium Replacement - Follow Nurse / BPA Driven Protocol, , Does not apply, PRLacie FLOREZ Nicholas J, APRN    promethazine (PHENERGAN) tablet 12.5 mg, 12.5 mg, Oral, Q6H PRN **OR** promethazine (PHENERGAN) suppository 12.5 mg, 12.5 mg, Rectal, Q6H PRN, Michael Medellin APRN    sodium chloride 0.9 % flush 10 mL, 10 mL, Intravenous, Q12H, Michael Medellin APRN, 10 mL at 06/23/24 0954    sodium chloride 0.9 % flush 10 mL, 10 mL, Intravenous, PRN, Michael Medellin APRN    sodium chloride 0.9 % infusion 40 mL, 40 mL, Intravenous, PRN, Lacie, Michael J, APRN    tiotropium (SPIRIVA RESPIMAT) 2.5 mcg/act aerosol solution inhaler, 2 puff, Inhalation, Daily - RT, Michael Medellin, MEET     Palliative Performance Scale Score:      BP 98/83   Pulse (!) 129   Temp 97.6 °F (36.4 °C) (Oral)   Resp 28   Ht 185.4 cm (73\")   Wt 63.5 kg (140 lb)   SpO2 94%   BMI 18.47 kg/m²     Intake/Output Summary (Last 24 hours) at 6/23/2024 1442  Last data filed at 6/23/2024 1400  Gross per 24 hour   Intake " 2658.8 ml   Output 1500 ml   Net 1158.8 ml       Physical Exam:    General Appearance:    Alert, cooperative, NAD   HEENT:    NC/AT, EOMI, anicteric, MMM, face relaxed   Neck:   supple, trachea midline, 3 cm JVD and + hepatojugular reflux   Lungs:     Coarse BS  bilat, diminished in bases; respirations regular, even , increased work of breathing, + accessory use    Heart:    RRR, normal S1 and S2, +M/R/G   Abdomen:     Normal bowel sounds, soft, nontender, nondistended   G/U:   Deferred   MSK/Extremities:   No clubbing , cyanosis or edema, No wasting   Pulses:   Pulses palpable and equal bilaterally   Skin:   Warm, dry, +mottling feet > hands   Neurologic:   A/Ox3, cooperative, moves extremities x 4, no tremor, nl     tone   Psych:   Calm, appropriate         Labs:   Results from last 7 days   Lab Units 06/23/24  1110   WBC 10*3/mm3 13.18*   HEMOGLOBIN g/dL 18.5*   HEMATOCRIT % 56.0*   PLATELETS 10*3/mm3 240     Results from last 7 days   Lab Units 06/23/24  1110   SODIUM mmol/L 134*   POTASSIUM mmol/L 4.0   CHLORIDE mmol/L 92*   CO2 mmol/L 20.0*   BUN mg/dL 45*   CREATININE mg/dL 1.20   CALCIUM mg/dL 8.5*   BILIRUBIN mg/dL 2.6*   ALK PHOS U/L 117   ALT (SGPT) U/L 34   AST (SGOT) U/L 37   GLUCOSE mg/dL 126*     Imaging Results (Last 72 Hours)       Procedure Component Value Units Date/Time    XR Chest 1 View [[id number]] Collected: 06/23/24 0034     Updated: 06/23/24 0038    Narrative:      XR CHEST 1 VW    Date of Exam: 6/23/2024 12:28 AM EDT    Indication: Chest Pain Triage Protocol    Comparison: None available.    Findings:  There are no airspace consolidations. No pleural fluid. No pneumothorax. The pulmonary vasculature appears within normal limits. The heart is enlarged.. No acute osseous abnormality identified.      Impression:      Impression:  No acute cardiopulmonary process. There is cardiomegaly.      Electronically Signed: Ivet Valenzuela MD    6/23/2024 12:35 AM EDT    Workstation ID: [id number]                 Diagnostics:   No valid procedures specified.    A:     COPD exacerbation    PAF (paroxysmal atrial fibrillation) w/ RVR    Ischemic cardiomyopathy (EF 15-20%)    Empyema    Tobacco use    CAD (coronary artery disease)    Chronic HFrEF (heart failure with reduced ejection fraction)         Impression:   ESHD with EF <20  Ischemic cardiomyopathy  COPD with exacerbation  PAF        Symptoms:  CP  Dyspnea  Debility       P:      Advance Care Planning     Date of Shared Decision Making Discussion: 06/23/24    Pt was/were present for the discussion.    Decision Maker:  pt    SDMSI SUMMARY:    Patient/family describes current medical condition as end of life    Patient/family identified the following as being most important to living well: no pain and able to breathe      Explored understanding, benefits and burdens, goals for treatment, fears and concerns of  interventions as resuscitation, pressor support, comfort measures    Reviewed goals of care for additional medical interventions using decision making framework:      MOST form, Living Will and EMS DNR  introduced if not previously completed.    DECISIONS/RECOMMENDATIONS for FOLLOW-UP:   Pt does want to be followed by hospice while in hospital and is considering options.  Is uncertain if he wants hospice after discharge.  Is pleased with current level of care.  Confirmed no CPR and DNI.      Time started: 1055     Time ended: 1114    Total time: 19 minutes      Will continue to monitor for pt needs and support through decisions. Thank you for this consult and allowing us to participate in patient's plan of care. Palliative Care Team will continue to follow patient.       Mar Nickerson MD, 6/23/2024, 14:42 EDT       Parent

## 2024-06-23 NOTE — ED PROVIDER NOTES
"Subjective   History of Present Illness  64 year old patient with history of \"irregular heart beat\" and COPD with ongoing cigarette smoking presents to the emergency department brought by EMS with concerns about shortness of breath and epigastric discomfort with associated cough for approximately the past five days, which worsened tonight. EMS administered aspirin 324 mg orally prior to arrival. He denies use of anticoagulants although his records from [facility] indicate he was prescribed Eliquis in January of 2024.      Review of Systems   Constitutional:  Negative for diaphoresis and fever.   HENT:  Negative for facial swelling.    Eyes:  Negative for photophobia and discharge.   Respiratory:  Positive for cough and shortness of breath. Negative for stridor.    Cardiovascular:  Positive for leg swelling.   Gastrointestinal:  Positive for abdominal pain (epigastric). Negative for nausea.   Neurological:  Negative for speech difficulty.       No past medical history on file. \"Irregular heart beat\" COPD, chronic pain on MS contin and gabapentin daily.    No Known Allergies    No past surgical history on file.    No family history on file.    Social History     Socioeconomic History    Marital status: Single     Smokes cigarettes daily      Objective   Physical Exam  Vitals and nursing note reviewed.   Constitutional:       General: He is not in acute distress.     Appearance: He is not diaphoretic.      Comments: BMI 18.   HENT:      Head:      Comments: Moist mucosa. No mucosal pallor.  Neck:      Vascular: No JVD.      Trachea: No tracheal deviation.   Cardiovascular:      Comments: S1, S2, irregularly irregular rhythm, tachycardic rate. Distant heart sounds. No murmur appreciated.  Pulmonary:      Effort: Tachypnea and respiratory distress present.      Breath sounds: No stridor. Wheezing present.      Comments: Tachypneic, conversational dyspnea. Coughs during exam. Speaks a few words at a time due to shortness of " breath. Bilateral expiratory wheezing. No rales appreciated.   Chest:      Chest wall: No tenderness.   Abdominal:      Palpations: Abdomen is soft.      Tenderness: There is no abdominal tenderness. There is no guarding.      Comments: nondistended   Musculoskeletal:      Comments: 1+ bilateral lower extremity edema, symmetric. No calf tenderness bilaterally.   Skin:     General: Skin is warm and dry.   Neurological:      Mental Status: He is alert.      Comments: Normal speech, no dysarthria, no facial droop.         Procedures           ED Course  ED Course as of 06/23/24 0121   Sun Jun 23, 2024   0047 Repeat blood pressure 85/54.  MAP 69.  Heart rate 125 bpm on DuoNeb. [LD]   0109 Prior records are now available which were not initially available when he arrived. He was hospitalized at  in January, EF 15-20%, ICM hx, afib hx. [LD]   0110 WBC(!): 17.18 [LD]   0110 BUN(!): 45 [LD]   0110 Creatinine: 1.25 [LD]   0110 Sodium(!): 133 [LD]   0114 HS Troponin T(!): 40 [LD]   0114 proBNP(!): 23,448.0  40,000 five months ago on review of prior records  [LD]      ED Course User Index  [LD] Sangeetha Garcia MD KASPER reviewed by Madalyn Louis MD, Sangeetha Garcia MD       Medical Decision Making  Differential diagnosis includes COPD exacerbation, pneumonia, atrial fibrillation with rapid ventricular response, electrolyte abnormality, thyroid disease, dehydration, acute coronary syndrome, and others.  EMS activated a STEMI alert, however upon review of the patient's EKG, it appears that he has no reciprocal changes, and does not meet any Sgarbossa criteria for STEMI at this time.      Problems Addressed:  Acute cough: complicated acute illness or injury  Acute on chronic congestive heart failure, unspecified heart failure type: complicated acute illness or injury  Atrial fibrillation with rapid ventricular response: complicated acute illness or injury  COPD with acute  exacerbation: complicated acute illness or injury  Hypotension, unspecified hypotension type: complicated acute illness or injury  Ischemic cardiomyopathy: complicated acute illness or injury  Leukocytosis, unspecified type: complicated acute illness or injury  Peripheral edema: complicated acute illness or injury  Shortness of breath: complicated acute illness or injury  Tobacco use disorder: complicated acute illness or injury    Amount and/or Complexity of Data Reviewed  Independent Historian: EMS  External Data Reviewed: labs and notes.     Details: I reviewed his prior records from  from January 2024. These were not initially available until he was fully registered. He has not been to our facility previously apparently. All prior labs and records that I reviewed were from outside our emergency department. It appears the majority of his care has been through .   Labs: ordered. Decision-making details documented in ED Course.  Radiology: ordered. Decision-making details documented in ED Course.  ECG/medicine tests: ordered and independent interpretation performed. Decision-making details documented in ED Course.     Details: EKG at 0014 shows atrial fibrillation with rapid ventricular response at a rate of 150 beats per minutes, nonspecific intraventricular block, nonspecific ST-T wave changes.  No prior EKGs available for comparison. Repeat EKG at 0018 shows atrial fibrillation with rapid ventricular response at a rate of 143 beats per minute, nonspecific intraventricular block.   Discussion of management or test interpretation with external provider(s): I discussed the patient with Dr. Zaldivar, cardiologist on-call, who recommends Alireza-Synephrine drip, heparin drip, ICU admission. He viewed the patient's EKG and cancelled STEMI activation.     Risk  OTC drugs.  Prescription drug management.  Decision regarding hospitalization.  Diagnosis or treatment significantly limited by social determinants of  health.  Risk Details: Patient demonstrates poor health literacy.    Critical Care  Total time providing critical care: 33 minutes (Cardiovascular decompensation risk, hypotension, tachycardia. Interventional include discussion with specialists, vasopressor initiation, ABG interpretation, review of prior records, and others.)      Recent Results (from the past 24 hour(s))   ECG 12 Lead ED Triage Standing Order; Chest Pain    Collection Time: 06/23/24 12:14 AM   Result Value Ref Range    QT Interval 318 ms    QTC Interval 502 ms   High Sensitivity Troponin T    Collection Time: 06/23/24 12:18 AM    Specimen: Blood   Result Value Ref Range    HS Troponin T 40 (H) <22 ng/L   Comprehensive Metabolic Panel    Collection Time: 06/23/24 12:18 AM    Specimen: Blood   Result Value Ref Range    Glucose 106 (H) 65 - 99 mg/dL    BUN 45 (H) 8 - 23 mg/dL    Creatinine 1.25 0.76 - 1.27 mg/dL    Sodium 133 (L) 136 - 145 mmol/L    Potassium 4.7 3.5 - 5.2 mmol/L    Chloride 98 98 - 107 mmol/L    CO2 24.0 22.0 - 29.0 mmol/L    Calcium 8.5 (L) 8.6 - 10.5 mg/dL    Total Protein 6.1 6.0 - 8.5 g/dL    Albumin 3.8 3.5 - 5.2 g/dL    ALT (SGPT) 24 1 - 41 U/L    AST (SGOT) 25 1 - 40 U/L    Alkaline Phosphatase 104 39 - 117 U/L    Total Bilirubin 1.9 (H) 0.0 - 1.2 mg/dL    Globulin 2.3 gm/dL    A/G Ratio 1.7 g/dL    BUN/Creatinine Ratio 36.0 (H) 7.0 - 25.0    Anion Gap 11.0 5.0 - 15.0 mmol/L    eGFR 64.3 >60.0 mL/min/1.73   Lipase    Collection Time: 06/23/24 12:18 AM    Specimen: Blood   Result Value Ref Range    Lipase 19 13 - 60 U/L   BNP    Collection Time: 06/23/24 12:18 AM    Specimen: Blood   Result Value Ref Range    proBNP 23,448.0 (H) 0.0 - 900.0 pg/mL   Green Top (Gel)    Collection Time: 06/23/24 12:18 AM   Result Value Ref Range    Extra Tube Hold for add-ons.    Lavender Top    Collection Time: 06/23/24 12:18 AM   Result Value Ref Range    Extra Tube hold for add-on    Gold Top - SST    Collection Time: 06/23/24 12:18 AM   Result  Value Ref Range    Extra Tube Hold for add-ons.    Gray Top    Collection Time: 06/23/24 12:18 AM   Result Value Ref Range    Extra Tube Hold for add-ons.    Light Blue Top    Collection Time: 06/23/24 12:18 AM   Result Value Ref Range    Extra Tube Hold for add-ons.    CBC Auto Differential    Collection Time: 06/23/24 12:18 AM    Specimen: Blood   Result Value Ref Range    WBC 17.18 (H) 3.40 - 10.80 10*3/mm3    RBC 5.30 4.14 - 5.80 10*6/mm3    Hemoglobin 16.9 13.0 - 17.7 g/dL    Hematocrit 50.6 37.5 - 51.0 %    MCV 95.5 79.0 - 97.0 fL    MCH 31.9 26.6 - 33.0 pg    MCHC 33.4 31.5 - 35.7 g/dL    RDW 14.5 12.3 - 15.4 %    RDW-SD 50.7 37.0 - 54.0 fl    MPV 11.3 6.0 - 12.0 fL    Platelets 204 140 - 450 10*3/mm3    Neutrophil % 78.5 (H) 42.7 - 76.0 %    Lymphocyte % 11.3 (L) 19.6 - 45.3 %    Monocyte % 9.4 5.0 - 12.0 %    Eosinophil % 0.1 (L) 0.3 - 6.2 %    Basophil % 0.2 0.0 - 1.5 %    Immature Grans % 0.5 0.0 - 0.5 %    Neutrophils, Absolute 13.50 (H) 1.70 - 7.00 10*3/mm3    Lymphocytes, Absolute 1.94 0.70 - 3.10 10*3/mm3    Monocytes, Absolute 1.61 (H) 0.10 - 0.90 10*3/mm3    Eosinophils, Absolute 0.02 0.00 - 0.40 10*3/mm3    Basophils, Absolute 0.03 0.00 - 0.20 10*3/mm3    Immature Grans, Absolute 0.08 (H) 0.00 - 0.05 10*3/mm3    nRBC 0.0 0.0 - 0.2 /100 WBC   TSH Rfx On Abnormal To Free T4    Collection Time: 06/23/24 12:18 AM    Specimen: Blood   Result Value Ref Range    TSH 3.170 0.270 - 4.200 uIU/mL   D-dimer, Quantitative    Collection Time: 06/23/24 12:18 AM    Specimen: Blood   Result Value Ref Range    D-Dimer, Quantitative 1.72 (H) 0.00 - 0.64 MCGFEU/mL   Lactic Acid, Plasma    Collection Time: 06/23/24 12:18 AM    Specimen: Blood   Result Value Ref Range    Lactate 2.2 (C) 0.5 - 2.0 mmol/L   Magnesium    Collection Time: 06/23/24 12:18 AM    Specimen: Blood   Result Value Ref Range    Magnesium 2.0 1.6 - 2.4 mg/dL   Ethanol    Collection Time: 06/23/24 12:18 AM    Specimen: Blood   Result Value Ref Range     Ethanol <10 0 - 10 mg/dL   Heparin Anti-Xa    Collection Time: 06/23/24 12:18 AM    Specimen: Blood   Result Value Ref Range    Heparin Anti-Xa (UFH) 0.10 (L) 0.30 - 0.70 IU/ml   Protime-INR    Collection Time: 06/23/24 12:18 AM    Specimen: Blood   Result Value Ref Range    Protime 17.5 (H) 12.2 - 14.5 Seconds    INR 1.42 (H) 0.89 - 1.12   aPTT    Collection Time: 06/23/24 12:18 AM    Specimen: Blood   Result Value Ref Range    PTT 29.8 (L) 60.0 - 90.0 seconds   ECG 12 Lead Chest Pain    Collection Time: 06/23/24 12:18 AM   Result Value Ref Range    QT Interval 330 ms    QTC Interval 509 ms   Blood Gas, Arterial With Co-Ox    Collection Time: 06/23/24  1:03 AM    Specimen: Arterial Blood   Result Value Ref Range    Site Right Radial     Jhoan's Test N/A     pH, Arterial 7.430 7.350 - 7.450 pH units    pCO2, Arterial 27.2 (L) 35.0 - 45.0 mm Hg    pO2, Arterial 92.6 83.0 - 108.0 mm Hg    HCO3, Arterial 18.1 (L) 20.0 - 26.0 mmol/L    Base Excess, Arterial -4.5 (L) 0.0 - 2.0 mmol/L    Hemoglobin, Blood Gas 16.3 13.5 - 17.5 g/dL    Hematocrit, Blood Gas 50.1 38.0 - 51.0 %    Oxyhemoglobin 94.3 94 - 99 %    Methemoglobin 0.20 0.00 - 1.50 %    Carboxyhemoglobin 3.8 (H) 0 - 2 %    CO2 Content 18.9 (L) 22 - 33 mmol/L    Temperature 37.0     Barometric Pressure for Blood Gas      Modality Nasal Cannula     FIO2 36 %    Rate 0 Breaths/minute    PIP 0 cmH2O    IPAP 0     EPAP 0     pH, Temp Corrected 7.430 pH Units    pCO2, Temperature Corrected 27.2 (L) 35 - 48 mm Hg    pO2, Temperature Corrected 92.6 83 - 108 mm Hg     Note: In addition to lab results from this visit, the labs listed above may include labs taken at another facility or during a different encounter within the last 24 hours. Please correlate lab times with ED admission and discharge times for further clarification of the services performed during this visit.    XR Chest 1 View   Final Result   Impression:   No acute cardiopulmonary process. There is  cardiomegaly.         Electronically Signed: Ivet Valenzuela MD     6/23/2024 12:35 AM EDT     Workstation ID: [id number]        Vitals:    06/23/24 0037 06/23/24 0045 06/23/24 0111 06/23/24 0117   BP:   95/73 (!) 36/24   Patient Position:       Pulse: (!) 138 (!) 144 (!) 135 (!) 135   Resp:  24     SpO2:  98%     Weight:       Height:         Medications   sodium chloride 0.9 % flush 10 mL (has no administration in time range)   heparin (porcine) injection 4,000 Units (has no administration in time range)   heparin 88948 units/250 mL (100 units/mL) in 0.45 % NaCl infusion (has no administration in time range)   Pharmacy to Dose Heparin (has no administration in time range)   phenylephrine (RUBINA-SYNEPHRINE) 50 mg in sodium chloride 0.9 % 250 mL infusion (1.1 mcg/kg/min × 63.5 kg Intravenous Rate/Dose Change 6/23/24 0117)   cefTRIAXone (ROCEPHIN) 2,000 mg in sodium chloride 0.9 % 100 mL MBP (has no administration in time range)   doxycycline (VIBRAMYCIN) 100 mg in sodium chloride 0.9 % 100 mL MBP (has no administration in time range)   digoxin (LANOXIN) injection 250 mcg (has no administration in time range)   sodium chloride 0.9 % bolus 1,000 mL (1,000 mL Intravenous New Bag 6/23/24 0038)   ipratropium-albuterol (DUO-NEB) nebulizer solution 3 mL (3 mL Nebulization Given 6/23/24 0045)   methylPREDNISolone sodium succinate (SOLU-Medrol) injection 125 mg (125 mg Intravenous Given 6/23/24 0054)     ECG/EMG Results (last 24 hours)       Procedure Component Value Units Date/Time    ECG 12 Lead ED Triage Standing Order; Chest Pain [[id number]] Collected: 06/23/24 0014     Updated: 06/23/24 0014     QT Interval 318 ms      QTC Interval 502 ms     Narrative:      Test Reason : ED Triage Standing Order~  Blood Pressure :   */*   mmHG  Vent. Rate : 150 BPM     Atrial Rate : 166 BPM     P-R Int :   * ms          QRS Dur : 134 ms      QT Int : 318 ms       P-R-T Axes :   * 184 101 degrees     QTc Int : 502 ms    ** Suspect arm lead  reversal, interpretation assumes no reversal  Atrial fibrillation with rapid ventricular response  Nonspecific intraventricular block  Anterolateral infarct , age undetermined  Abnormal ECG  No previous ECGs available    Referred By: edmd           Confirmed By:     ECG 12 Lead Chest Pain [[id number]] Collected: 06/23/24 0018     Updated: 06/23/24 0018     QT Interval 330 ms      QTC Interval 509 ms     Narrative:      Test Reason : Chest Pain  Blood Pressure :   */*   mmHG  Vent. Rate : 143 BPM     Atrial Rate : 122 BPM     P-R Int :   * ms          QRS Dur : 134 ms      QT Int : 330 ms       P-R-T Axes :   * 126   0 degrees     QTc Int : 509 ms    Atrial fibrillation with rapid ventricular response  Nonspecific intraventricular block  Lateral infarct (cited on or before 23-JUN-2024)  Abnormal ECG  When compared with ECG of 23-JUN-2024 00:17, (Unconfirmed)  No significant change was found    Referred By: edmd           Confirmed By:           ECG 12 Lead Chest Pain   Preliminary Result   Test Reason : Chest Pain   Blood Pressure :   */*   mmHG   Vent. Rate : 143 BPM     Atrial Rate : 122 BPM      P-R Int :   * ms          QRS Dur : 134 ms       QT Int : 330 ms       P-R-T Axes :   * 126   0 degrees      QTc Int : 509 ms      Atrial fibrillation with rapid ventricular response   Nonspecific intraventricular block   Lateral infarct (cited on or before 23-JUN-2024)   Abnormal ECG   When compared with ECG of 23-JUN-2024 00:17, (Unconfirmed)   No significant change was found      Referred By: edmd           Confirmed By:       ECG 12 Lead ED Triage Standing Order; Chest Pain   Preliminary Result   Test Reason : ED Triage Standing Order~   Blood Pressure :   */*   mmHG   Vent. Rate : 150 BPM     Atrial Rate : 166 BPM      P-R Int :   * ms          QRS Dur : 134 ms       QT Int : 318 ms       P-R-T Axes :   * 184 101 degrees      QTc Int : 502 ms      ** Suspect arm lead reversal, interpretation assumes no reversal    Atrial fibrillation with rapid ventricular response   Nonspecific intraventricular block   Anterolateral infarct , age undetermined   Abnormal ECG   No previous ECGs available      Referred By: edmd           Confirmed By:       ECG 12 Lead ED Triage Standing Order; Chest Pain    (Results Pending)           Final diagnoses:   Atrial fibrillation with rapid ventricular response   Shortness of breath   Acute cough   Peripheral edema   Ischemic cardiomyopathy   Hypotension, unspecified hypotension type   Leukocytosis, unspecified type   Tobacco use disorder   Acute on chronic congestive heart failure, unspecified heart failure type   COPD with acute exacerbation       ED Disposition  ED Disposition       ED Disposition   Decision to Admit    Condition   --    Comment   Level of Care: Critical Care [6]   Admitting Physician: BESSY JACOBS [[id number]]   Attending Physician: BESSY JACOBS [[id number]]                 No follow-up provider specified.       Medication List      No changes were made to your prescriptions during this visit.            Sangeetha Garcia MD  06/23/24 0121       Sangeetha Garcia MD  06/25/24 8566

## 2024-06-23 NOTE — Clinical Note
Level of Care: Critical Care [6]   Admitting Physician: BESSY JACOBS [[id number]]   Attending Physician: BESSY JACOBS [[id number]]

## 2024-06-23 NOTE — PROGRESS NOTES
"Continued Stay Note  [facility]     Patient Name: Clem Wright  MRN: [id number]  Today's Date: 6/23/2024    Admit Date: 6/23/2024    Plan: TBD   Discharge Plan       Row Name 06/23/24 1227       Plan    Plan TBD- Home hospice patient under the care of the intensivist    Plan Comments  is a home hospice patient of [facility]. Hospice RN to bedside. Patient is agreeable to hospice continuing to follow patient while he is in the hospital. He is unsure if he will still want hospice when he goes home and says we'll \"have to ask my old lady\". Dr. Nickerson at bedside discussing GOC with patient. Patient confirms he is a DNR and is fine with the level of care he is receiving in the ICU at this time. Discussed with hospice medical director, Dr. Maria M Godoy. Hospice team to continue following patient as he may decline and require inpatient hospice services. Hospice RN will update [facility] home team on patient being at [facility] in the ICU. If inpatient hospice team can be of any assist, please call ext [phone].                   Discharge Codes    No documentation.                       Celina Mancilla RN    "

## 2024-06-23 NOTE — PLAN OF CARE
Goal Outcome Evaluation:  Plan of Care Reviewed With: patient        Progress: improving     -Admitted from ED around 0800.   -Currently on Alireza @1.4 mcg/kg/min and 4L NC.  -AOx4   -Resting in bed; 2 mg of morphine given and zofran x1.  -Adequate UOP; BMx1  -Palliative and hospice team aware of admission.

## 2024-06-23 NOTE — H&P
CRITICAL CARE ADMISSION NOTE       Patient's name Clem Wright MRN: [id number] : 1960-64 y.o. male  Admission date 2024  Length of stay 0  ICU LOS Patient does not have an ICU stay during this admission.    REASON FOR CONSULTATION / MAIN COMPLAINT  Chest Pain     HISTORY OF MAIN COMPLAINT    Clem Wright is a 64 y.o. male w/ PMHX of COPD, tobacco use, PAF, & end-stage ICM who is in Hospice care.  He has been experiencing increased SOA & pedal edema x 1 week.  He has had epigastric pain and worsening cough x 5 D for which he was brought to our ED.  The patient is in AF w/ RVR and hypotensive upon arrival.  He has been started on phenylephrine to support his BP.      PAST MEDICAL HISTORY:  Past Medical History:   Diagnosis Date    CAD (coronary artery disease) 2024    Empyema 2024    Ischemic cardiomyopathy (EF 15-20%) 2024    PAF (paroxysmal atrial fibrillation) w/ RVR 2024       PAST SURGICAL HISTORY:  H/o PCI    FAMILY HISTORY:  Alzheimers disease mother    SOCIAL HISTORY:  Smoker, currently on home hospice for end stage heart failure    ALLERGIES:  No Known Allergies    HOME MEDS INCLUDE  Eliquis 5 mg bid  Bumex 2 mg qd  Dapagliflozin 10 mg qd  Digoxin 125 mcg qd  Gabapentin 100 mcg qhs  Losartan 12.5 mg qd  Dulera 2 puffs bid      SCHEDULED MEDS:  budesonide-formoterol, 2 puff, Inhalation, BID - RT  bumetanide, 2 mg, Intravenous, Once  doxycycline, 100 mg, Intravenous, Once  enoxaparin, 40 mg, Subcutaneous, Daily  gabapentin, 100 mg, Oral, Nightly  magnesium sulfate, 2 g, Intravenous, Once  methylPREDNISolone sodium succinate, 40 mg, Intravenous, Q12H  senna-docusate sodium, 2 tablet, Oral, BID  sodium chloride, 10 mL, Intravenous, Q12H  tiotropium bromide monohydrate, 2 puff, Inhalation, Daily - RT         CONTINUOUS INFUSIONS:  phenylephrine, 0.5-3 mcg/kg/min, Last Rate: 1.1 mcg/kg/min (24 0208)       REVIEW OF SYSTEMS:  Review of Systems - Negative except as  noted in HPI  History obtained from chart review and the patient      PHYSICAL EXAMINATION:    Temp:  [96.5 °F (35.8 °C)] 96.5 °F (35.8 °C)  Heart Rate:  [108-165] 122  Resp:  [22-24] 24  BP: ()/() 110/64    Intake/Output Summary (Last 24 hours) at 6/23/2024 0252  Last data filed at 6/23/2024 0212  Gross per 24 hour   Intake 1118.8 ml   Output --   Net 1118.8 ml          General appearance: ill appearing  Trachea: midline  Lymphatic: no lymphadenopathy.  Chest: rhonchi BL  Heart: irregular tachy, BL LE edema 2+  Abdomen: soft, non-tender  Genitourinary: no SP TTP  Neurological: awake, alert  Psychiatric: appropriate  Skin: no rash    DATA REVIEW:    1. Medical chart reviewed in detail  2. I reviewed the actual EKG rhythm strips and Pulse Oximetry data on the monitors  3. I reviewed today's lab values and the report of the most recent Chest X ray.  4. In addition, I have independently reviewed the actual image of the most recent chest Xray    Hematology:  Results from last 7 days   Lab Units 06/23/24  0018   WBC 10*3/mm3 17.18*   HEMOGLOBIN g/dL 16.9   HEMATOCRIT % 50.6   PLATELETS 10*3/mm3 204     Chemistry:  Estimated Creatinine Clearance: 53.6 mL/min (by C-G formula based on SCr of 1.25 mg/dL).  Results from last 7 days   Lab Units 06/23/24  0018   SODIUM mmol/L 133*   POTASSIUM mmol/L 4.7   CHLORIDE mmol/L 98   CO2 mmol/L 24.0   BUN mg/dL 45*   CREATININE mg/dL 1.25   GLUCOSE mg/dL 106*     Results from last 7 days   Lab Units 06/23/24  0018   CALCIUM mg/dL 8.5*   MAGNESIUM mg/dL 2.0     Hepatic Panel:  Results from last 7 days   Lab Units 06/23/24  0018   ALBUMIN g/dL 3.8   TOTAL PROTEIN g/dL 6.1   BILIRUBIN mg/dL 1.9*   AST (SGOT) U/L 25   ALT (SGPT) U/L 24   ALK PHOS U/L 104     Coagulation Labs:  Results from last 7 days   Lab Units 06/23/24  0018   PROTIME Seconds 17.5*   INR  1.42*   APTT seconds 29.8*      Cardiac Labs:  Results from last 7 days   Lab Units 06/23/24  0018   PROBNP pg/mL  23,448.0*   HSTROP T ng/L 40*     Biomarkers:  Results from last 7 days   Lab Units 06/23/24  0018   LACTATE mmol/L 2.2*   D DIMER QUANT MCGFEU/mL 1.72*     U/A          Arterial Blood Gases:  Results from last 7 days   Lab Units 06/23/24  0103   PH, ARTERIAL pH units 7.430   PCO2, ARTERIAL mm Hg 27.2*   PO2 ART mm Hg 92.6   FIO2 % 36       Images:  XR Chest 1 View    Result Date: 6/23/2024  Impression: No acute cardiopulmonary process. There is cardiomegaly. Electronically Signed: Ivet Valenzuela MD  6/23/2024 12:35 AM EDT  Workstation ID: [id number]     Echo:  EF 10-15%        ASSESSMENT & PLAN     Patient Active Problem List    Diagnosis     *COPD exacerbation [J44.1]     PAF (paroxysmal atrial fibrillation) w/ RVR [I48.0]     Ischemic cardiomyopathy (EF 15-20%) [I25.5]     Empyema [J86.9]     Tobacco use [Z72.0]     CAD (coronary artery disease) [I25.10]     Chronic HFrEF (heart failure with reduced ejection fraction) [I50.22]       65 yo M with h/o end-stage cardiomyopathy, HFrEF, EF <20%, COPD, on home hospice, presented with COPD exacerbation and worsening of HF, right sided, cor pulmonale physiology.     Neuro - awake, alert, no issues, okay to use morphine prn for SOB, resume homedose gabapentin  CV - end stage heart disease, likely had R heart worsening, with worsening of LE edema, probably due to COPD exac. Continue diuresis, at home on bumex 2 mg daily, will diuresis until euvolemia achieved. Per [facility] records, supposedly was taking eliquis, but denies now. Will have to find his current list of meds  Pulm - appears in COPD exacerbation, BL rhonchi, sputum production, will use steroids, nebs, doxy, chest PT.    - monitor Cr, replace lytes, monitor kidney fn.  GI - no issues, diet  ID - doxy for COPD exac, resp viral panel  HO - unsure what happened with eliquis, will start DVT ppx and attempt to find out if patient is still prescribed AC  Endo - monitor BG, now on steroids    Bowel regimen: prn  Diet: Diet:  Cardiac, Diabetic, Renal; Healthy Heart (2-3 Na+); Consistent Carbohydrate; Low Sodium (2-3g), Low Potassium, Low Phosphorus; Fluid Consistency: Thin (IDDSI 0)  GI ppx: not indicated  DVT PPX: VTE Prophylaxis:  Pharmacologic VTE prophylaxis orders are present.       Advance Directives: Medical Intervention Limits: No intubation (DNI); No dialysis; No artificial nutrition; Other  Code Status (Patient has no pulse and is not breathing): No CPR (Do Not Attempt to Resuscitate)  Medical Interventions (Patient has pulse or is breathing): Limited Support  Additional Medical Interventions Limits: no invasive procedues       Dispo: ICU    I have spent a total of 45 critical care minutes in the management of this patient, apart from any time taken to perform necessary procedures. Critical care time includes time reviewing chart, images, report of images, rounding with nurse, respiratory therapist and pharmacist, and discussions with available family at bedside.    Signed: Madalyn Louis MD  02:52 EDT 6/23/2024      4 minutes of critical care provided by MEET Ruiz in addendum accordance with split/shared billing. This time excludes other billable procedures. Time does include preparation of documents, medical consultations, review of old records, and direct bedside care. Patient is at high risk for life-threatening deterioration due to COPD exacerbation, end stage ICM.   Electronically signed by MEET Campuzano, 06/23/24, 2:19 AM EDT.

## 2024-06-24 VITALS
OXYGEN SATURATION: 97 % | HEIGHT: 73 IN | WEIGHT: 139.99 LBS | BODY MASS INDEX: 18.55 KG/M2 | DIASTOLIC BLOOD PRESSURE: 77 MMHG | RESPIRATION RATE: 20 BRPM | SYSTOLIC BLOOD PRESSURE: 93 MMHG | HEART RATE: 110 BPM | TEMPERATURE: 97.9 F

## 2024-06-24 LAB
ANION GAP SERPL CALCULATED.3IONS-SCNC: 15 MMOL/L (ref 5–15)
BUN SERPL-MCNC: 53 MG/DL (ref 8–23)
BUN/CREAT SERPL: 45.7 (ref 7–25)
CALCIUM SPEC-SCNC: 8 MG/DL (ref 8.6–10.5)
CHLORIDE SERPL-SCNC: 94 MMOL/L (ref 98–107)
CO2 SERPL-SCNC: 22 MMOL/L (ref 22–29)
CREAT SERPL-MCNC: 1.16 MG/DL (ref 0.76–1.27)
DEPRECATED RDW RBC AUTO: 48.7 FL (ref 37–54)
DIGOXIN SERPL-MCNC: 1.55 NG/ML (ref 0.6–1.2)
EGFRCR SERPLBLD CKD-EPI 2021: 70.3 ML/MIN/1.73
ERYTHROCYTE [DISTWIDTH] IN BLOOD BY AUTOMATED COUNT: 14.2 % (ref 12.3–15.4)
GLUCOSE SERPL-MCNC: 116 MG/DL (ref 65–99)
HCT VFR BLD AUTO: 47.4 % (ref 37.5–51)
HGB BLD-MCNC: 16 G/DL (ref 13–17.7)
MAGNESIUM SERPL-MCNC: 2 MG/DL (ref 1.6–2.4)
MCH RBC QN AUTO: 32.1 PG (ref 26.6–33)
MCHC RBC AUTO-ENTMCNC: 33.8 G/DL (ref 31.5–35.7)
MCV RBC AUTO: 95 FL (ref 79–97)
PHOSPHATE SERPL-MCNC: 4.4 MG/DL (ref 2.5–4.5)
PLATELET # BLD AUTO: 167 10*3/MM3 (ref 140–450)
PMV BLD AUTO: 11.1 FL (ref 6–12)
POTASSIUM SERPL-SCNC: 3.3 MMOL/L (ref 3.5–5.2)
QT INTERVAL: 316 MS
QT INTERVAL: 318 MS
QT INTERVAL: 330 MS
QTC INTERVAL: 452 MS
QTC INTERVAL: 502 MS
QTC INTERVAL: 509 MS
RBC # BLD AUTO: 4.99 10*6/MM3 (ref 4.14–5.8)
SODIUM SERPL-SCNC: 131 MMOL/L (ref 136–145)
WBC NRBC COR # BLD AUTO: 12.95 10*3/MM3 (ref 3.4–10.8)

## 2024-06-24 PROCEDURE — 25010000002 BUMETANIDE PER 0.5 MG: Performed by: NURSE PRACTITIONER

## 2024-06-24 PROCEDURE — 83735 ASSAY OF MAGNESIUM: CPT | Performed by: NURSE PRACTITIONER

## 2024-06-24 PROCEDURE — 99238 HOSP IP/OBS DSCHRG MGMT 30/<: CPT | Performed by: NURSE PRACTITIONER

## 2024-06-24 PROCEDURE — 85027 COMPLETE CBC AUTOMATED: CPT | Performed by: NURSE PRACTITIONER

## 2024-06-24 PROCEDURE — 94664 DEMO&/EVAL PT USE INHALER: CPT

## 2024-06-24 PROCEDURE — 84100 ASSAY OF PHOSPHORUS: CPT | Performed by: NURSE PRACTITIONER

## 2024-06-24 PROCEDURE — 80048 BASIC METABOLIC PNL TOTAL CA: CPT | Performed by: NURSE PRACTITIONER

## 2024-06-24 PROCEDURE — 94799 UNLISTED PULMONARY SVC/PX: CPT

## 2024-06-24 PROCEDURE — 25010000002 METHYLPREDNISOLONE PER 40 MG: Performed by: NURSE PRACTITIONER

## 2024-06-24 PROCEDURE — 80162 ASSAY OF DIGOXIN TOTAL: CPT | Performed by: NURSE PRACTITIONER

## 2024-06-24 PROCEDURE — 25010000002 ENOXAPARIN PER 10 MG: Performed by: NURSE PRACTITIONER

## 2024-06-24 PROCEDURE — 25010000002 DIGOXIN PER 500 MCG: Performed by: NURSE PRACTITIONER

## 2024-06-24 RX ORDER — GABAPENTIN 300 MG/1
600 CAPSULE ORAL EVERY 12 HOURS SCHEDULED
Status: DISCONTINUED | OUTPATIENT
Start: 2024-06-24 | End: 2024-06-24 | Stop reason: HOSPADM

## 2024-06-24 RX ORDER — DIGOXIN 125 MCG
125 TABLET ORAL
COMMUNITY

## 2024-06-24 RX ORDER — POTASSIUM CHLORIDE 20 MEQ/1
40 TABLET, EXTENDED RELEASE ORAL EVERY 4 HOURS
Qty: 4 TABLET | Refills: 0 | Status: COMPLETED | OUTPATIENT
Start: 2024-06-24 | End: 2024-06-24

## 2024-06-24 RX ORDER — PREDNISONE 20 MG/1
40 TABLET ORAL
Status: DISCONTINUED | OUTPATIENT
Start: 2024-06-25 | End: 2024-06-24 | Stop reason: HOSPADM

## 2024-06-24 RX ORDER — PREDNISONE 20 MG/1
40 TABLET ORAL
Qty: 8 TABLET | Refills: 0 | Status: SHIPPED | OUTPATIENT
Start: 2024-06-25 | End: 2024-06-29

## 2024-06-24 RX ADMIN — ENOXAPARIN SODIUM 40 MG: 100 INJECTION SUBCUTANEOUS at 08:19

## 2024-06-24 RX ADMIN — Medication 10 ML: at 08:22

## 2024-06-24 RX ADMIN — MORPHINE SULFATE 30 MG: 30 TABLET, FILM COATED, EXTENDED RELEASE ORAL at 08:19

## 2024-06-24 RX ADMIN — DIGOXIN 125 MCG: 125 TABLET ORAL at 11:30

## 2024-06-24 RX ADMIN — BUDESONIDE AND FORMOTEROL FUMARATE DIHYDRATE 2 PUFF: 160; 4.5 AEROSOL RESPIRATORY (INHALATION) at 12:53

## 2024-06-24 RX ADMIN — BUMETANIDE 2 MG: 0.25 INJECTION, SOLUTION INTRAMUSCULAR; INTRAVENOUS at 09:44

## 2024-06-24 RX ADMIN — SENNOSIDES AND DOCUSATE SODIUM 2 TABLET: 50; 8.6 TABLET ORAL at 08:19

## 2024-06-24 RX ADMIN — METHYLPREDNISOLONE SODIUM SUCCINATE 40 MG: 40 INJECTION, POWDER, FOR SOLUTION INTRAMUSCULAR; INTRAVENOUS at 09:54

## 2024-06-24 RX ADMIN — POTASSIUM CHLORIDE 40 MEQ: 1500 TABLET, EXTENDED RELEASE ORAL at 08:19

## 2024-06-24 RX ADMIN — TIOTROPIUM BROMIDE INHALATION SPRAY 2 PUFF: 3.12 SPRAY, METERED RESPIRATORY (INHALATION) at 12:53

## 2024-06-24 RX ADMIN — POTASSIUM CHLORIDE 40 MEQ: 1500 TABLET, EXTENDED RELEASE ORAL at 11:31

## 2024-06-24 RX ADMIN — DIGOXIN 250 MCG: 0.25 INJECTION INTRAMUSCULAR; INTRAVENOUS at 00:58

## 2024-06-24 RX ADMIN — GABAPENTIN 600 MG: 300 CAPSULE ORAL at 11:30

## 2024-06-24 RX ADMIN — METOLAZONE 10 MG: 5 TABLET ORAL at 08:19

## 2024-06-24 NOTE — NURSING NOTE
Meds to beds delivered predinisone.   D/c instructions gone over. Notified to follow up with hospice team. Belongings packed, Ivs removed. [employer] wheelchair with O2 picked up from room.

## 2024-06-24 NOTE — PROGRESS NOTES
Continued Stay Note  [facility]     Patient Name: Clem Wright  MRN: [id number]  Today's Date: 6/24/2024    Admit Date: 6/23/2024    Plan: Home with hospice   Discharge Plan       Row Name 06/24/24 1249       Plan    Plan Home with hospice    Plan Comments Hospice RN to bedside. Patient lying awake in bed. Denies pain. Does report some shortness of breath. Said rest helps with this. He would like to go home today if possible. Asked if he wanted to go back home with continued hospice services and he says yes. Discussed with Dr. Cordero who is going to see the patient and determine if he is ready for discharge. Hospice will coordinate transportation home for patient. If the hospice team can be of any assist, please call ext [phone].                   Discharge Codes    No documentation.                 Expected Discharge Date and Time       Expected Discharge Date Expected Discharge Time    Jul 2, 2024               Celina Mancilla RN

## 2024-06-24 NOTE — PROGRESS NOTES
Clinical Nutrition     Patient Name: Clem Wright  YOB: 1960  MRN: [id number]  Date of Encounter: 06/24/24 11:14 EDT  Admission date: 6/23/2024  Reason for Visit: MDR, Identified at risk by screening criteria, Malnutrition Severity Assessment    Assessment   Nutrition Assessment   Admission Diagnosis:  COPD exacerbation [J44.1]    Problem List:    COPD exacerbation    PAF (paroxysmal atrial fibrillation) w/ RVR    Ischemic cardiomyopathy (EF 15-20%)    Empyema    Tobacco use    CAD (coronary artery disease)    Chronic HFrEF (heart failure with reduced ejection fraction)      PMH:   He  has a past medical history of CAD (coronary artery disease) (06/23/2024), Empyema (06/23/2024), Ischemic cardiomyopathy (EF 15-20%) (06/23/2024), and PAF (paroxysmal atrial fibrillation) w/ RVR (06/23/2024).    PSH:  He  has no past surgical history on file.    Substance history: tobacco    Labs    Labs Reviewed: Yes    Results from last 7 days   Lab Units 06/24/24  0441 06/23/24  1821 06/23/24  1232 06/23/24  1110 06/23/24  0018   GLUCOSE mg/dL 116*  --   --  126* 106*   BUN mg/dL 53*  --   --  45* 45*   CREATININE mg/dL 1.16  --   --  1.20 1.25   SODIUM mmol/L 131*  --   --  134* 133*   CHLORIDE mmol/L 94*  --   --  92* 98   POTASSIUM mmol/L 3.3*  --   --  4.0 4.7   PHOSPHORUS mg/dL 4.4  --   --  4.8*  --    MAGNESIUM mg/dL 2.0  --   --  2.2 2.0   ALT (SGPT) U/L  --   --   --  34 24   LACTATE mmol/L  --  4.3* 4.2* 4.6* 2.2*       Results from last 7 days   Lab Units 06/23/24  1110 06/23/24  0018   ALBUMIN g/dL 3.9 3.8           Lab Results   Lab Value Date/Time    HGBA1C 5.4 09/06/2023 2139         Results from last 7 days   Lab Units 06/23/24  0018   PROBNP pg/mL 23,448.0*       Medications    Medications Reviewed: yes      Intake/Ouptut 24 hrs (0701 - 0700)   I&O's Reviewed: yes  Intake & Output (last day)         06/23 0701 06/24 0700 06/24 0701 06/25 0700    P.O. 1440     I.V. (mL/kg) 506.3  "(8)     IV Piggyback      Total Intake(mL/kg) 1946.3 (30.7)     Urine (mL/kg/hr) 2950 (1.9)     Stool 0     Total Output 2950     Net -1003.7           Urine Unmeasured Occurrence 1 x     Stool Unmeasured Occurrence 1 x              Anthropometrics     Height: Height: 185.4 cm (72.99\")  Last Filed Weight: Weight: 63.5 kg (139 lb 15.9 oz) (06/23/24 1520)  Method:    BMI: BMI (Calculated): 18.5    UBW: 160lb    Weight change:  20lb wt loss over past year, 13% wt loss    Per EMR: pt weighed 160lb 7-30-14, 160lb 3-23-24, 158lb 9-5-23     Weight     Weight (kg) Weight (lbs)   6/23/2024 63.504 kg  140 lb     63.5 kg  139 lb 15.9 oz        Nutrition Focused Physical Exam     Date: 6-24-24    Patient meets criteria for malnutrition diagnosis, see MSA note.     Subjective   Reported/Observed/Food/Nutrition Related History:     Pt resting in bed, on nasal cannula reports fair appetite, no difficulty swallowing, states he is UBW was ~ 160lb's, has lost down to 140lb's over past year, has been sick a lot, and not able to eat much, typically eats 2 meals per days, drinks ensure at home, very weak, only able to get out of bed 3x/day      Current Nutrition Prescription     PO: Diet: Cardiac, Diabetic, Renal; Healthy Heart (2-3 Na+); Consistent Carbohydrate; Low Sodium (2-3g), Low Potassium, Low Phosphorus; Fluid Consistency: Thin (IDDSI 0)  Oral Nutrition Supplement:  Intake: Insufficient data: 50% of 2 meals    Assessment & Plan   Nutrition Diagnosis   Date: 6-24-24 Updated:   Problem Malnutrition     Etiology COPD/ CHF   Signs/Symptoms Po intake < 75%, 20lb wt loss over 1 year, 13% wt loss. Moderate mucle wasting and fat loss,      Goal:   Nutrition to support treatment and Establish PO    Palliative and Hospice following for C    Nutrition Intervention      Follow treatment progress, Care plan reviewed, Advised available snacks, Menu adjusted, Encourage intake, Supplement provided    dc renal and diabetic restrictions as not " indicated    Add Ensure HP 2x/day    Pt meets criteria for chronic moderate malnutrition 2nd COPD, CHF, evidenced by po intake < 75%, 20lb wt loss over 1 year, 13% wt loss, moderate muscle wasting and fat loss      Monitoring/Evaluation:   Per protocol, I&O, PO intake, Supplement intake, Pertinent labs, Weight, Skin status, GI status, Symptoms, POC/GOC, Swallow function    Yohana Vasquez, CAMERON  Time Spent: 60min

## 2024-06-24 NOTE — PLAN OF CARE
Problem: Adult Inpatient Plan of Care  Goal: Plan of Care Review  Outcome: Adequate for Care Transition  Flowsheets (Taken 6/24/2024 9036)  Progress: no change  Plan of Care Reviewed With: patient  Outcome Evaluation: Discharge home with hospice   Goal Outcome Evaluation:  Plan of Care Reviewed With: patient        Progress: no change  Outcome Evaluation: Discharge home with hospice

## 2024-06-24 NOTE — PROGRESS NOTES
"Palliative Care Daily Progress Note     C/C: states he feels \"back to normal\" this am.  Verbalizes that he feels ready to go home.  APRN discussed returning home with hospice, pt is agreeable to continue to have hospice at home.  He has equipment and medication available at home to meet his needs.     S: Medical record reviewed. Follow up visit for goals of care. No new events noted overnight.      ROS: denies pain, denies nausea.  + SOA, just received bath. Oxygen dependent. - edema.     O: Code Status:   Code Status and Medical Interventions:   Ordered at: 06/23/24 0129     Medical Intervention Limits:    No intubation (DNI)    No dialysis    No artificial nutrition    Other     Code Status (Patient has no pulse and is not breathing):    No CPR (Do Not Attempt to Resuscitate)     Medical Interventions (Patient has pulse or is breathing):    Limited Support     Additional Medical Interventions Limits:    no invasive procedues      Advanced Directives: Advance Directive Status: Patient has advance directive, copy requested   Goals of Care: Ongoing.   Palliative Performance Scale Score: 50%     /87 (BP Location: Left arm, Patient Position: Lying)   Pulse 78   Temp 97.4 °F (36.3 °C) (Oral)   Resp 18   Ht 185.4 cm (72.99\")   Wt 63.5 kg (139 lb 15.9 oz)   SpO2 99%   BMI 18.47 kg/m²     Intake/Output Summary (Last 24 hours) at 6/24/2024 1122  Last data filed at 6/24/2024 0700  Gross per 24 hour   Intake 1586.3 ml   Output 2250 ml   Net -663.7 ml       PE:  General Appearance:    Alert, cooperative, NAD   HEENT:    NC/AT, EOMI, anicteric, MMM, face relaxed   Neck:   supple, trachea midline, no JVD   Lungs:     CTA bilat, respirations regular, even and unlabored; RR on exam    Heart:    RRR   Abdomen:     Normal bowel sounds, soft, non-tender, non-distended   G/U:   Deferred   MSK/Extremities:   Wasting, no edema   Pulses:   Pulses palpable and equal bilaterally   Skin:   Warm, dry   Neurologic:   A/Ox3, " cooperative   Psych:   Calm, appropriate     Meds: Reviewed and changes noted    Labs:   Results from last 7 days   Lab Units 06/24/24  0441   WBC 10*3/mm3 12.95*   HEMOGLOBIN g/dL 16.0   HEMATOCRIT % 47.4   PLATELETS 10*3/mm3 167     Results from last 7 days   Lab Units 06/24/24  0441   SODIUM mmol/L 131*   POTASSIUM mmol/L 3.3*   CHLORIDE mmol/L 94*   CO2 mmol/L 22.0   BUN mg/dL 53*   CREATININE mg/dL 1.16   GLUCOSE mg/dL 116*   CALCIUM mg/dL 8.0*     Results from last 7 days   Lab Units 06/24/24  0441 06/23/24  1110   SODIUM mmol/L 131* 134*   POTASSIUM mmol/L 3.3* 4.0   CHLORIDE mmol/L 94* 92*   CO2 mmol/L 22.0 20.0*   BUN mg/dL 53* 45*   CREATININE mg/dL 1.16 1.20   CALCIUM mg/dL 8.0* 8.5*   BILIRUBIN mg/dL  --  2.6*   ALK PHOS U/L  --  117   ALT (SGPT) U/L  --  34   AST (SGOT) U/L  --  37   GLUCOSE mg/dL 116* 126*     Imaging Results (Last 72 Hours)       Procedure Component Value Units Date/Time    XR Chest 1 View [[id number]] Collected: 06/23/24 0034     Updated: 06/23/24 0038    Narrative:      XR CHEST 1 VW    Date of Exam: 6/23/2024 12:28 AM EDT    Indication: Chest Pain Triage Protocol    Comparison: None available.    Findings:  There are no airspace consolidations. No pleural fluid. No pneumothorax. The pulmonary vasculature appears within normal limits. The heart is enlarged.. No acute osseous abnormality identified.      Impression:      Impression:  No acute cardiopulmonary process. There is cardiomegaly.      Electronically Signed: Ivet Valenzuela MD    6/23/2024 12:35 AM EDT    Workstation ID: [id number]                  Diagnostics: Reviewed    A:   COPD exacerbation    PAF (paroxysmal atrial fibrillation) w/ RVR    Ischemic cardiomyopathy (EF 15-20%)    Empyema    Tobacco use    CAD (coronary artery disease)    Chronic HFrEF (heart failure with reduced ejection fraction)       S/Sx:  1. COPD Exac:  -Oxygen dependent  -Continued tobacco abuse  -Continue MS Contin BID   -Continue symbicort, solumedrol  and nebulizer PRN for SOA     2. Ischemic cardiomyopathy  -EF <20%     3. Chronic HFrEF   -Oxygen dependent.   -Continue diuresis with bumex  -Edema in BLE improved today    4. GOC   -Established DNR/DNI  -Currently enrolled in home hospice with [facility].   -Agreeable to return home with [facility] hospice when medically stable for discharge.     P:   Palliative Care Team will continue to follow patient. Please do not hesitate to contact us regarding further sx mgmt or GOC needs.  Piper Pride, APRN  6/24/2024  Time spent:30 min

## 2024-06-24 NOTE — PROGRESS NOTES
Malnutrition Severity Assessment    Patient Name:  Clem Wright  YOB: 1960  MRN: [id number]  Admit Date:  6/23/2024    Patient meets criteria for : Moderate (non-severe) Malnutrition    Malnutrition Severity Assessment  Malnutrition Type: Chronic Disease - Related Malnutrition  Malnutrition Type (Last 8 Hours)       Malnutrition Severity Assessment       Row Name 06/24/24 1131       Malnutrition Severity Assessment    Malnutrition Type Chronic Disease - Related Malnutrition      Row Name 06/24/24 1131       Insufficient Energy Intake     Insufficient Energy Intake Findings Moderate    Insufficient Energy Intake  <75% of est. energy requirement for > or equal to 3 months      Row Name 06/24/24 1131       Unintentional Weight Loss     Unintentional Weight Loss Findings --  20lb wt loss over 1 year, 13% wt loss      Row Name 06/24/24 1131       Muscle Loss    Loss of Muscle Mass Findings Moderate    [place] Region Moderate - slight depression    Clavicle Bone Region Moderate - some protrusion in females, visible in males    Patellar Region Moderate - patella more prominent, less muscle definition around patella    Anterior Thigh Region Moderate - mild depression on inner thigh    Posterior Calf Region Moderate - some roundness, slight firmness      Row Name 06/24/24 1131       Fat Loss    Subcutaneous Fat Loss Findings Moderate    Orbital Region  Moderate -  somewhat hollowness, slightly dark circles    Upper Arm Region Moderate - some fat tissue, not ample    Thoracic & Lumbar Region Moderate - ribs visible with mild depressions, iliac crest somewhat prominent      Row Name 06/24/24 1131       Fluid Accumulation (Edema)    Fluid Acumulation Findings --  BLE edema      Row Name 06/24/24 1131       Criteria Met (Must meet criteria for severity in at least 2 of these categories: M Wasting, Fat Loss, Fluid, Secondary Signs, Wt. Status, Intake)    Patient meets criteria for  Moderate (non-severe)  Malnutrition                    Electronically signed by:  Yohana Vasquez RD  06/24/24 11:33 EDT

## 2024-06-24 NOTE — DISCHARGE SUMMARY
DISCHARGE SUMMARY       Patient name: Clem Wright  CSN: [id number]  MRN: [id number]  : 1960    Date of Admission: 2024  Date of Discharge:  2024    Admitting Physician: Madalyn Louis MD   Primary Care Provider: Provider, No Known  Consultations:   Mar Nickerson MD  Palliative Care   Piper Pride, APRN  Hospice     Admission Diagnosis: COPD exacerbation      Discharge Diagnoses:     COPD exacerbation    PAF (paroxysmal atrial fibrillation) w/ RVR    Ischemic cardiomyopathy (EF 15-20%)    Empyema    Tobacco use    CAD (coronary artery disease)    Chronic HFrEF (heart failure with reduced ejection fraction)    Imaging:  XR Chest 1 View    Result Date: 2024  Impression: No acute cardiopulmonary process. There is cardiomegaly. Electronically Signed: Ivet Valenzuela MD  2024 12:35 AM EDT  Workstation ID: [id number]     History of Present Illness:  Clem Wright is a 64 y.o. male [facility].     Patient has a PMH of tobacco use, COPD, PAF, and end-stage ICM currently in Hospice care.     He presented to our ED via EMS on  for evaluation of increased shortness of breath and pedal edema x1 week. He was noted to be in atrial fibrillation with RVR as well as hypotensive and was initiated on phenylephrine for blood pressure support.     He was initiated on IV Solu-Medrol and Bumex with admission to ICU for further management.     Hospital Course:  Patient was admitted to ICU 2* issues discussed in the above HPI and continued on IV steroids and diuretics with good response. He was able to be weaned from pressors quickly and by the following day had returned to his pre-hospitalization baseline. Hospice was consulted and following further discussions patient was still agreeable to go home with Hospice as he is still current / using [facility]. He will be discharged today with Prednisone taper.     Vitals:  BP 93/77 (BP  "Location: Left arm, Patient Position: Lying)   Pulse 110   Temp 97.9 °F (36.6 °C) (Oral)   Resp 20   Ht 185.4 cm (72.99\")   Wt 63.5 kg (139 lb 15.9 oz)   SpO2 97%   BMI 18.47 kg/m²     Physical Exam:  Constitutional: Ill-appearing male resting supine in bed in NAD.   HEENT:  Normocephalic and atraumatic. PER  Neck: Normal range of motion. Neck supple.  Cardiovascular: Irregular rate and rhythm noted. No gallop and no friction rub.  No murmur heard. Intact distal pulses.   Pulmonary/Chest: Breath sounds with scattered rhonchi noted upon auscultation bilaterally. No wheezes or rales. Productive cough with white frothy sputum noted.   Abdominal: Soft. No distension and no mass. There is no tenderness.   Musculoskeletal: Normal range of motion.   Neurological: Alert and oriented to person, place, and time.  No focal deficits  Skin: Skin is warm and dry. No rash noted.   Extremities:  No clubbing or cyanosis. Bilateral 2+ lower extremity edema noted.   Psychiatric: Normal mood and affect. Behavior is normal.     Labs:  Results from last 7 days   Lab Units 06/24/24  0441   WBC 10*3/mm3 12.95*   HEMOGLOBIN g/dL 16.0   HEMATOCRIT % 47.4   PLATELETS 10*3/mm3 167     Results from last 7 days   Lab Units 06/24/24  0441 06/23/24  1110   SODIUM mmol/L 131* 134*   POTASSIUM mmol/L 3.3* 4.0   CHLORIDE mmol/L 94* 92*   CO2 mmol/L 22.0 20.0*   BUN mg/dL 53* 45*   CREATININE mg/dL 1.16 1.20   CALCIUM mg/dL 8.0* 8.5*   BILIRUBIN mg/dL  --  2.6*   ALK PHOS U/L  --  117   ALT (SGPT) U/L  --  34   AST (SGOT) U/L  --  37   GLUCOSE mg/dL 116* 126*         Magnesium   Date Value Ref Range Status   06/24/2024 2.0 1.6 - 2.4 mg/dL Final   06/23/2024 2.2 1.6 - 2.4 mg/dL Final   06/23/2024 2.0 1.6 - 2.4 mg/dL Final     Phosphorus   Date Value Ref Range Status   06/24/2024 4.4 2.5 - 4.5 mg/dL Final   06/23/2024 4.8 (H) 2.5 - 4.5 mg/dL Final           Discharge Medications:     Discharge Medications        Continue These Medications        " Instructions Start Date   digoxin 125 MCG tablet  Commonly known as: LANOXIN   125 mcg, Oral, Daily Digoxin      gabapentin 600 MG tablet  Commonly known as: NEURONTIN   600 mg, Oral, 2 Times Daily      Morphine 30 MG 12 hr tablet  Commonly known as: MS CONTIN   30 mg, Oral, 2 Times Daily, Patient does take 1 in AM and 2 in PM; only prescribed 1 BID             ASK your doctor about these medications        Instructions Start Date   predniSONE 20 MG tablet  Commonly known as: DELTASONE  Ask about: Which instructions should I use?   Take 2 tablets (40 mg) by mouth daily  x 4 days. Do not take scheduled dexamethasone while taking prednisone.      predniSONE 20 MG tablet  Commonly known as: DELTASONE  Ask about: Which instructions should I use?   40 mg, Oral, Daily With Breakfast   Start Date: June 25, 2024            Discharge Instructions:  D/C home with Hospice services   Medications as above     Current Code Status       Date Active Code Status Order ID Comments User Context       6/23/2024 0129 No CPR (Do Not Attempt to Resuscitate) [id number]  Michael Medellin APRN ED        Question Answer    Code Status (Patient has no pulse and is not breathing) No CPR (Do Not Attempt to Resuscitate)    Medical Interventions (Patient has pulse or is breathing) Limited Support    Medical Intervention Limits: No intubation (DNI)     No dialysis     No artificial nutrition     Other    Additional Medical Interventions Limits: no invasive procedues             Gaby Campbell, TOMEKA, APRN, [facility]  Pulmonary and Critical Care Medicine  06/24/24     Time: Discharge 15 min    CC: Provider, No Known    Please note that portions of this note were completed with a voice recognition program.

## 2024-06-27 LAB
QT INTERVAL: 364 MS
QTC INTERVAL: 527 MS

## 2024-06-28 LAB
BACTERIA SPEC AEROBE CULT: NORMAL
BACTERIA SPEC AEROBE CULT: NORMAL